# Patient Record
Sex: FEMALE | Race: WHITE | NOT HISPANIC OR LATINO | Employment: UNEMPLOYED | URBAN - METROPOLITAN AREA
[De-identification: names, ages, dates, MRNs, and addresses within clinical notes are randomized per-mention and may not be internally consistent; named-entity substitution may affect disease eponyms.]

---

## 2017-02-22 ENCOUNTER — APPOINTMENT (OUTPATIENT)
Dept: PULMONOLOGY | Facility: HOSPICE | Age: 57
End: 2017-02-22
Payer: MEDICAID

## 2017-02-28 ENCOUNTER — OFFICE VISIT (OUTPATIENT)
Dept: PULMONOLOGY | Facility: HOSPICE | Age: 57
End: 2017-02-28
Payer: MEDICAID

## 2017-02-28 VITALS
SYSTOLIC BLOOD PRESSURE: 118 MMHG | RESPIRATION RATE: 14 BRPM | HEART RATE: 96 BPM | HEIGHT: 65 IN | WEIGHT: 210 LBS | BODY MASS INDEX: 34.99 KG/M2 | DIASTOLIC BLOOD PRESSURE: 84 MMHG

## 2017-02-28 DIAGNOSIS — Z87.01 HISTORY OF PNEUMONIA: ICD-10-CM

## 2017-02-28 DIAGNOSIS — Z72.0 TOBACCO ABUSE: ICD-10-CM

## 2017-02-28 DIAGNOSIS — J44.9 CHRONIC OBSTRUCTIVE PULMONARY DISEASE, UNSPECIFIED COPD TYPE (HCC): ICD-10-CM

## 2017-02-28 DIAGNOSIS — G47.34 NOCTURNAL HYPOXEMIA: ICD-10-CM

## 2017-02-28 PROCEDURE — 99214 OFFICE O/P EST MOD 30 MIN: CPT | Performed by: NURSE PRACTITIONER

## 2017-02-28 RX ORDER — BUDESONIDE AND FORMOTEROL FUMARATE DIHYDRATE 160; 4.5 UG/1; UG/1
2 AEROSOL RESPIRATORY (INHALATION) 2 TIMES DAILY
Qty: 1 INHALER | Refills: 5 | Status: SHIPPED | OUTPATIENT
Start: 2017-02-28 | End: 2017-08-04 | Stop reason: SDUPTHER

## 2017-02-28 NOTE — PROGRESS NOTES
Chief Complaint   Patient presents with   • Follow-Up       HPI:  Pal Whiteside is a 56 y.o. year old female here today for follow-up on COPD. Last OV was with Zelda DENTON 8/28/15. History of pneumonia in 2014 requiring intubation and bronchoscopy that was negative. She was treated with abx/steroid therapy and recovered well. She smokes <1 pack daily. PFT 5/12/14 indicated FVC 2.38 L 73% predicted, FEV1 1.88 L 73% predicted, FEV1/FVC 79%, and DLCO 114% predicted. She is compliant with Symbicort 160/4.5 µg twice daily, Spiriva daily, and ProAir which is minimally used. Insurance switched her to Proventil HFA and she does not feel this is as effective. She uses FELICITAS 1-2x's weekly. She recently ran out of Symbicort. She uses 2.5L oxygen nightly and has not received updated tubing/supplies from [facility] and her filters have not been changed. She believes her dyspnea is stable but has a routine AM cough with green phlegm. She denies wheezing. She had a cold last week that is almost gone except mild nasal congestion. She denies any major changes in health since last OV except hearing loss to left hear - she is pending hearing test. She would like to quit smoking. Her  recently had an MI and he also smokes. They are trying to make healthy changes to improve health. She denies fever, chills, night sweats, chest pain, ankle swelling or hemoptysis. She denies GERD, sinus issues, headaches or dizziness on a regular basis.      ROS: As per HPI and otherwise negative if not stated.    Past Medical History   Diagnosis Date   • Arthritis      right knee   • EMPHYSEMA    • Breath shortness      occasional   • Dental disorder      upper and lower dentures   • Pain      right foot   • Psychiatric problem      Bipolar   • JONATHAN (obstructive sleep apnea)      Suspected       Past Surgical History   Procedure Laterality Date   • Umbilical hernia repair child  as child   • Foot surgery  1997     reconstruction, right foot surgery  "times three   • Biopsy ortho  10/4/2010     Performed by ANDREY PARK at SURGERY [facility]   • Loose body removal  10/4/2010     Performed by ANDREY PARK at SURGERY [facility] ORS       Family History   Problem Relation Age of Onset   • Diabetes     • Hypertension Mother    • Heart Disease Father    • Lung Disease Father    • Diabetes Brother      Type I DM       Social History     Social History   • Marital Status:      Spouse Name: N/A   • Number of Children: N/A   • Years of Education: N/A     Occupational History   • Not on file.     Social History Main Topics   • Smoking status: Former Smoker -- 0.50 packs/day for 32 years     Types: Cigarettes     Quit date: 07/15/2014   • Smokeless tobacco: Not on file   • Alcohol Use: 1.2 oz/week     2 Standard drinks or equivalent per week      Comment: occasional, 2-4 drinks, mixed drinks   • Drug Use: No      Comment: 10 year hx of meth use, denies IV drug use.   • Sexual Activity:     Partners: Male     Birth Control/ Protection: Post-Menopausal     Other Topics Concern   • Not on file     Social History Narrative       Allergies as of 02/28/2017 - Mal as Reviewed 02/28/2017   Allergen Reaction Noted   • Penicillins Rash 08/15/2014   • Lamotrigine  08/15/2014        @Vital signs for this encounter:  Filed Vitals:    02/28/17 1404   Height: 1.651 m (5' 5\")   Weight: 95.255 kg (210 lb)   Weight % change since last entry.: 0 %   BP: 118/84   Pulse: 96   BMI (Calculated): 34.95   Resp: 14   TempSrc: Temporal   O2 sat % room air: 99 %       Current medications as of today   Current Outpatient Prescriptions   Medication Sig Dispense Refill   • budesonide-formoterol (SYMBICORT) 160-4.5 MCG/ACT Aerosol Inhale 2 Puffs by mouth 2 Times a Day. 1 Inhaler 5   • Tiotropium Bromide Monohydrate (SPIRIVA RESPIMAT) 2.5 MCG/ACT Aero Soln Inhale 2 Inhalation by mouth every day. Assemble and prime. 1 Inhaler 5   • albuterol 108 (90 BASE) MCG/ACT Aero Soln " inhalation aerosol Inhale 2 Puffs by mouth every four hours as needed for Shortness of Breath. 1 Inhaler 1   • albuterol 108 (90 BASE) MCG/ACT Aero Soln inhalation aerosol Inhale 2 Puffs by mouth every 6 hours as needed for Shortness of Breath.     • albuterol 108 (90 BASE) MCG/ACT Aero Soln inhalation aerosol Inhale 2 Puffs by mouth every 6 hours as needed for Shortness of Breath.     • albuterol (PROAIR HFA) 108 (90 BASE) MCG/ACT Aero Soln inhalation aerosol Inhale 2 Puffs by mouth every four hours as needed for Shortness of Breath (wheezing). 1 Inhaler 5   • divalproex ER (DEPAKOTE ER) 500 MG TABLET SR 24 HR Take 500 mg by mouth every day.     • gabapentin (NEURONTIN) 300 MG Cap Take 1 Cap by mouth 3 times a day. 90 Cap 3   • pregabalin (LYRICA) 75 MG Cap Take 1 Cap by mouth 2 times a day. 60 Cap 3   • doxepin (SINEQUAN) 50 MG CAPS Take 100 mg by mouth every evening.     • ranitidine (ZANTAC) 300 MG tablet Take 300 mg by mouth 2 Times a Day.     • multivitamin (THERAGRAN) TABS Take 1 Tab by mouth every day. Indications: **OTC**     • aripiprazole (ABILIFY) 5 MG tablet Take 5 mg by mouth every day.     • nicotine (NICODERM) 21 MG/24HR PT24 Apply 1 Patch to skin as directed every day. 30 Patch 0     No current facility-administered medications for this visit.         Physical Exam:   Gen:           Alert and oriented, No apparent distress. Mood and affect appropriate, normal interaction with examiner.  Eyes:          PERRL, EOM intact, sclere white, conjunctive moist.  Ears:          Not examined.  Hearing:     Grossly intact.  Nose:          Normal, no lesions or deformities.  Dentition:    Poor dentition.  Oropharynx:   Tongue normal, posterior pharynx without erythema or exudate.  Mallampati Classification: 3  Neck:        Supple, trachea midline, no masses.  Respiratory Effort: No intercostal retractions or use of accessory muscles.   Lung Auscultation:      Clear to auscultation bilaterally but diminished t/o;  no rales, rhonchi or wheezing.  CV:            Regular rate and rhythm. No murmurs, rubs or gallops.  Abd:           Not examined.   Lymphadenopathy: Not examined.  Gait and Station: Normal.  Digits and Nails: No clubbing, cyanosis, petechiae, or nodes.   Cranial Nerves: II-XII grossly intact.  Skin:        No rashes, lesions or ulcers noted.               Ext:           No cyanosis or edema.      Assessment:  1. Chronic obstructive pulmonary disease, unspecified COPD type (CMS-HCC)  budesonide-formoterol (SYMBICORT) 160-4.5 MCG/ACT Aerosol    Tiotropium Bromide Monohydrate (SPIRIVA RESPIMAT) 2.5 MCG/ACT Aero Soln    AMB PULMONARY FUNCTION TEST/LAB   2. Tobacco abuse  REFERRAL TO TOBACCO CESSATION PROGRAM   3. Nocturnal hypoxemia  DME O2 NEW SET UP    DME CNOX BY DME CO   4. History of pneumonia         Immunizations:    Flu:2016  Pneumovax 23:2014  Prevnar 13:needs updating.    Plan:  1. PFT next OV.  2. Update Prenvar 13 next OV.  3. DME o2 order for 2.5L nightly. Patient will continue to benefit from this.  4. DME CNOX on RA.   5. Refill on symbicort 160/4.5mcg 2puffs BID, start Spiriva Respimat 2.5mcg 2 puffs BID and FELICITAS prn.  6. Referral to smoking cessation.  7. Consider referral to lung cancer screening program at next OV.  8. Follow up in 6 weeks with PFT, sooner if needed.

## 2017-02-28 NOTE — PATIENT INSTRUCTIONS
1.  Next office visit update Prevnar 13 shot.  2. Breathing test next office visit.  3. Restart inhalers.  4. Follow up in 6 weeks with testing, sooner if needed.

## 2017-02-28 NOTE — MR AVS SNAPSHOT
"        Pal Whiteside   2017 2:20 PM   Office Visit   MRN: [id number]    Department:  [facility]   Dept Phone:  [phone]    Description:  Female : 1960   Provider:  DIPTI Goldstein           Reason for Visit     Follow-Up           Allergies as of 2017     Allergen Noted Reactions    Penicillins 08/15/2014   Rash    Lamotrigine 08/15/2014       \"Made me go cross-eyed.\"      You were diagnosed with     Chronic obstructive pulmonary disease, unspecified COPD type (CMS-HCC)   [8004215]       Tobacco abuse   [844990]       Nocturnal hypoxemia   [774356]       History of pneumonia   [960968]         Vital Signs     Blood Pressure Pulse Respirations Height Weight Body Mass Index    118/84 mmHg 96 14 1.651 m (5' 5\") 95.255 kg (210 lb) 34.95 kg/m2    Smoking Status                   Former Smoker           Basic Information     Date Of Birth Sex Race Ethnicity Preferred Language    1960 Female White Non- English      Your appointments     2017 12:00 PM   Pulmonary Function Test with PFT-RM3   [facility] (--)    [address]  [address]  [place] [id number] [phone]            2017  1:00 PM   Established Patient Pul with RAFIA GoldsteinRKELLIE   [facility] (--)    [address]  [address]  [place] [id number] [phone]              Problem List              ICD-10-CM Priority Class Noted - Resolved    Tobacco abuse Z72.0 Low  2014 - Present    Bipolar disorder (CMS-HCC) F31.9 Low  2014 - Present    COPD (chronic obstructive pulmonary disease) (CMS-HCC) J44.9   2016 - Present    Osteoarthritis of multiple joints M15.9   2016 - Present    Fibromyalgia M79.7   2016 - Present    S/P foot surgery Z98.890   2016 - Present    History of methamphetamine abuse Z87.898   2016 - Present    Nocturnal hypoxemia G47.34   2017 - Present    History of pneumonia " Z87.01   2/28/2017 - Present      Health Maintenance        Date Due Completion Dates    COLONOSCOPY 11/20/2011 ---    MAMMOGRAM 12/13/2012 12/13/2011, 5/2/2006, 10/25/2004    IMM INFLUENZA (1) 9/1/2016 ---    PAP SMEAR 1/27/2019 1/27/2016, 1/27/2016    IMM DTaP/Tdap/Td Vaccine (2 - Td) 1/20/2026 1/20/2016            Current Immunizations     Pneumococcal polysaccharide vaccine (PPSV-23) 8/6/2014 10:13 AM    Tdap Vaccine 1/20/2016      Below and/or attached are the medications your provider expects you to take. Review all of your home medications and newly ordered medications with your provider and/or pharmacist. Follow medication instructions as directed by your provider and/or pharmacist. Please keep your medication list with you and share with your provider. Update the information when medications are discontinued, doses are changed, or new medications (including over-the-counter products) are added; and carry medication information at all times in the event of emergency situations     Allergies:  PENICILLINS - Rash     LAMOTRIGINE - (reactions not documented)               Medications  Valid as of: February 28, 2017 -  2:39 PM    Generic Name Brand Name Tablet Size Instructions for use    Albuterol Sulfate (Aero Soln) albuterol 108 (90 BASE) MCG/ACT Inhale 2 Puffs by mouth every 6 hours as needed for Shortness of Breath.        Albuterol Sulfate (Aero Soln) albuterol 108 (90 BASE) MCG/ACT Inhale 2 Puffs by mouth every 6 hours as needed for Shortness of Breath.        Albuterol Sulfate (Aero Soln) albuterol 108 (90 BASE) MCG/ACT Inhale 2 Puffs by mouth every four hours as needed for Shortness of Breath.        Albuterol Sulfate (Aero Soln) albuterol 108 (90 BASE) MCG/ACT Inhale 2 Puffs by mouth every four hours as needed for Shortness of Breath (wheezing).        ARIPiprazole (Tab) ABILIFY 5 MG Take 5 mg by mouth every day.        Budesonide-Formoterol Fumarate (Aerosol) SYMBICORT 160-4.5 MCG/ACT Inhale 2 Puffs  by mouth 2 Times a Day.        Divalproex Sodium (TABLET SR 24 HR) DEPAKOTE  MG Take 500 mg by mouth every day.        Doxepin HCl (Cap) SINEQUAN 50 MG Take 100 mg by mouth every evening.        Gabapentin (Cap) NEURONTIN 300 MG Take 1 Cap by mouth 3 times a day.        Multiple Vitamin (Tab) THERAGRAN  Take 1 Tab by mouth every day. Indications: **OTC**        Nicotine (PATCH 24 HR) NICODERM 21 MG/24HR Apply 1 Patch to skin as directed every day.        Pregabalin (Cap) LYRICA 75 MG Take 1 Cap by mouth 2 times a day.        RaNITidine HCl (Tab) ZANTAC 300 MG Take 300 mg by mouth 2 Times a Day.        Tiotropium Bromide Monohydrate (Aero Soln) Tiotropium Bromide Monohydrate 2.5 MCG/ACT Inhale 2 Inhalation by mouth every day. Assemble and prime.        .                 Medicines prescribed today were sent to:     [id number] - [place], [place] - [address] [address]    Phone: [phone] Fax: [phone]    Open 24 Hours?: No      Medication refill instructions:       If your prescription bottle indicates you have medication refills left, it is not necessary to call your provider’s office. Please contact your pharmacy and they will refill your medication.    If your prescription bottle indicates you do not have any refills left, you may request refills at any time through one of the following ways: The online [employer]® system (except Urgent Care), by calling your provider’s office, or by asking your pharmacy to contact your provider’s office with a refill request. Medication refills are processed only during regular business hours and may not be available until the next business day. Your provider may request additional information or to have a follow-up visit with you prior to refilling your medication.   *Please Note: Medication refills are assigned a new Rx number when refilled electronically. Your pharmacy may indicate that no refills were authorized even though a  new prescription for the same medication is available at the pharmacy. Please request the medicine by name with the pharmacy before contacting your provider for a refill.        Your To Do List     Future Labs/Procedures Complete By Expires    AMB PULMONARY FUNCTION TEST/LAB  As directed 2/28/2018    Comments:    At next OV      Referral     A referral request has been sent to our patient care coordination department. Please allow 3-5 business days for us to process this request and contact you either by phone or mail. If you do not hear from us by the 5th business day, please call us at [phone].        Instructions    1.  Next office visit update Prevnar 13 shot.  2. Breathing test next office visit.  3. Restart inhalers.  4. Follow up in 6 weeks with testing, sooner if needed.       Other Notes About Your Plan     UDS on 12/4/15: positive for methamphetamines. PLEASE DO NOT PRESCRIBE PATIENT WITH NARCOTICS.            MyChart Status: Patient Declined

## 2017-03-01 ENCOUNTER — TELEPHONE (OUTPATIENT)
Dept: PULMONOLOGY | Facility: HOSPICE | Age: 57
End: 2017-03-01

## 2017-03-01 NOTE — TELEPHONE ENCOUNTER
MEDICATION PRIOR AUTHORIZATION NEEDED:    1. Name of Medication: Proair 108 (90 base)    2. Requested By (Name of Pharmacy): [facility]     3. Is insurance on file current? yes    4. What is the name & phone number of the 3rd party payor? Medicaid [phone]

## 2017-03-02 NOTE — TELEPHONE ENCOUNTER
DOCUMENTATION OF PRIOR AUTH STATUS    1. Medication name and dose: Proair 108 (90base) mcg    2. Name and Phone # of Prescription coverage company: Medicaid [phone]    3. Date Prior Auth was submitted: 3/1/2017    4. What information was given to obtain insurance decision: Clinical notes    5. Prior Auth letter Approved or Denied: Approved through 3/1/2018    6. Pharmacy notified: Yes    7. Patient notified: Yes

## 2017-03-15 DIAGNOSIS — R06.02 SHORTNESS OF BREATH: ICD-10-CM

## 2017-03-16 RX ORDER — ALBUTEROL SULFATE 90 MCG
HFA AEROSOL WITH ADAPTER (GRAM) INHALATION
Qty: 1 INHALER | Refills: 6 | Status: SHIPPED | OUTPATIENT
Start: 2017-03-16 | End: 2017-08-04

## 2017-03-16 NOTE — TELEPHONE ENCOUNTER
Have we ever prescribed this med? Yes.  If yes, what date? 02/28/2017    Last OV: 02/28/2017    Next OV: 04/05/2017    DX: SOB     Medications:   Requested Prescriptions     Pending Prescriptions Disp Refills   • PROVENTIL  (90 BASE) MCG/ACT Aero Soln inhalation aerosol [Pharmacy Med Name: PROVENTIL 108MCG/A  AER [facility]] 1 Inhaler 6     Sig: INHALE TWO PUFFS BY MOUTH EVERY FOUR HOURS AS NEEDED FOR SHORTNESS OF BREATH

## 2017-04-05 ENCOUNTER — APPOINTMENT (OUTPATIENT)
Dept: PULMONOLOGY | Facility: HOSPICE | Age: 57
End: 2017-04-05
Payer: MEDICAID

## 2017-04-26 ENCOUNTER — NON-PROVIDER VISIT (OUTPATIENT)
Dept: PULMONOLOGY | Facility: HOSPICE | Age: 57
End: 2017-04-26
Payer: MEDICAID

## 2017-04-26 ENCOUNTER — OFFICE VISIT (OUTPATIENT)
Dept: PULMONOLOGY | Facility: HOSPICE | Age: 57
End: 2017-04-26
Payer: MEDICAID

## 2017-04-26 VITALS
DIASTOLIC BLOOD PRESSURE: 88 MMHG | WEIGHT: 227 LBS | SYSTOLIC BLOOD PRESSURE: 142 MMHG | HEIGHT: 65 IN | RESPIRATION RATE: 16 BRPM | OXYGEN SATURATION: 95 % | BODY MASS INDEX: 37.82 KG/M2 | HEART RATE: 109 BPM

## 2017-04-26 DIAGNOSIS — J44.9 CHRONIC OBSTRUCTIVE PULMONARY DISEASE, UNSPECIFIED COPD TYPE (HCC): ICD-10-CM

## 2017-04-26 DIAGNOSIS — Z87.01 HISTORY OF PNEUMONIA: ICD-10-CM

## 2017-04-26 DIAGNOSIS — Z72.0 TOBACCO ABUSE: ICD-10-CM

## 2017-04-26 DIAGNOSIS — F15.11 HISTORY OF METHAMPHETAMINE ABUSE (HCC): ICD-10-CM

## 2017-04-26 DIAGNOSIS — G47.34 NOCTURNAL HYPOXEMIA: ICD-10-CM

## 2017-04-26 PROCEDURE — 99214 OFFICE O/P EST MOD 30 MIN: CPT | Mod: 25 | Performed by: NURSE PRACTITIONER

## 2017-04-26 PROCEDURE — 94729 DIFFUSING CAPACITY: CPT | Performed by: INTERNAL MEDICINE

## 2017-04-26 PROCEDURE — 90670 PCV13 VACCINE IM: CPT | Performed by: NURSE PRACTITIONER

## 2017-04-26 PROCEDURE — 94060 EVALUATION OF WHEEZING: CPT | Performed by: INTERNAL MEDICINE

## 2017-04-26 PROCEDURE — 94726 PLETHYSMOGRAPHY LUNG VOLUMES: CPT | Performed by: INTERNAL MEDICINE

## 2017-04-26 PROCEDURE — 90471 IMMUNIZATION ADMIN: CPT | Performed by: NURSE PRACTITIONER

## 2017-04-26 RX ORDER — ALBUTEROL SULFATE 2.5 MG/3ML
2.5 SOLUTION RESPIRATORY (INHALATION) EVERY 4 HOURS PRN
Qty: 75 ML | Refills: 5 | Status: SHIPPED | OUTPATIENT
Start: 2017-04-26 | End: 2017-08-04

## 2017-04-26 RX ORDER — ALBUTEROL SULFATE 90 UG/1
2 AEROSOL, METERED RESPIRATORY (INHALATION) EVERY 4 HOURS PRN
Qty: 1 INHALER | Refills: 5 | Status: SHIPPED | OUTPATIENT
Start: 2017-04-26 | End: 2017-08-28

## 2017-04-26 ASSESSMENT — PULMONARY FUNCTION TESTS
FEV1_PERCENT_CHANGE: -6
FEV1/FVC: 77.59
FVC: 2.48
FVC_PERCENT_PREDICTED: 73
FEV1: 1.8
FEV1_PERCENT_PREDICTED: 69
FVC: 2.32
FEV1/FVC_PERCENT_PREDICTED: 88
FEV1/FVC: 70
FEV1: 1.74
FEV1_PERCENT_CHANGE: 3
FEV1_PREDICTED: 2.49
FVC_PERCENT_PREDICTED: 78
FVC_PREDICTED: 3.18
FEV1/FVC_PERCENT_PREDICTED: 99
FEV1/FVC_PERCENT_PREDICTED: 78
FEV1/FVC_PERCENT_CHANGE: -50
FEV1_PERCENT_PREDICTED: 72

## 2017-04-26 NOTE — MR AVS SNAPSHOT
"        Pal Whiteside   2017 3:00 PM   Office Visit   MRN: [id number]    Department:  [facility]   Dept Phone:  [phone]    Description:  Female : 1960   Provider:  DIPTI Goldstein           Reason for Visit     Follow-Up 6 weeks w/pft      Allergies as of 2017     Allergen Noted Reactions    Penicillins 08/15/2014   Rash    Lamotrigine 08/15/2014       \"Made me go cross-eyed.\"      You were diagnosed with     Chronic obstructive pulmonary disease, unspecified COPD type (CMS-[facility])   [7392325]       Tobacco abuse   [740410]       Nocturnal hypoxemia   [924066]       History of pneumonia   [394845]       History of methamphetamine abuse   [863228]         Vital Signs     Blood Pressure Pulse Respirations Height Weight Body Mass Index    142/88 mmHg 109 16 1.651 m (5' 5\") 102.967 kg (227 lb) 37.77 kg/m2    Oxygen Saturation Smoking Status                95% Current Every Day Smoker          Basic Information     Date Of Birth Sex Race Ethnicity Preferred Language    1960 Female White Non- English      Your appointments     May 03, 2017 10:30 AM   Smoking Cessation Consult with [facility] EXAM 1   [facility]  (--)    [address] [address]   [phone]            May 03, 2017 11:00 AM   Smoking Cessation Class with PULMONARY REHAB PM CLASS   PULMON[facility] LAB OP [facility] [place] [facility] (--)    [address] [address]   [phone]           From [address], take the [facility] exit (#66) and head west on [address].  Turn right on [address]. Park in the [facility] Parking garage, or you may use our  parking.  The [facility] entrance is left of the  parking by the red and pink heart sculpture.            May 10, 2017 11:00 AM   Smoking Cessation Class with PULMONARY REHAB PM CLASS   PULMONARY LAB OP [facility] [place] [facility] (--)    [address] [facility] [address] [address]   [phone]          " From [facility], take the [address] exit (#[address]) and head west on [address].  Turn right on [address]. Park in the [address] Parking garage, or you may use our  parking.  The [facility] entrance is left of the  parking by the red and pink heart sculpture.            May 17, 2017 11:00 AM   Smoking Cessation Class with PULMONARY REHAB PM CLASS   PULMONARY LAB OP [facility] [place] [facility] (--)    [address] [facility] [id number] [phone]           From [facility], take the [address] exit (#[address]) and head west on [address].  Turn right on [address]. Park in the [address] Parking garage, or you may use our  parking.  The [facility] entrance is left of the  parking by the red and pink heart sculpture.            May 24, 2017 11:00 AM   Smoking Cessation Class with PULMONARY REHAB PM CLASS   PULMONARY LAB OP [facility] [place] [facility] (--)    [address] [facility] [id number] [phone]           From [facility], take the [address] exit (#[address]) and head west on [address].  Turn right on [address]. Park in the [address] Parking garage, or you may use our  parking.  The [facility] entrance is left of the  parking by the red and pink heart sculpture.            Jul 26, 2017  3:00 PM   Established Patient Pul with DIPTI Goldstein   [facility] (--)    [address]  [address]  [place] [id number] [phone]              Problem List              ICD-10-CM Priority Class Noted - Resolved    Tobacco abuse Z72.0 Low  7/22/2014 - Present    Bipolar disorder (CMS-HCC) F31.9 Low  7/22/2014 - Present    COPD (chronic obstructive pulmonary disease) (CMS-HCC) J44.9   1/20/2016 - Present    Osteoarthritis of multiple joints M15.9   1/20/2016 - Present    Fibromyalgia M79.7   1/20/2016 - Present    S/P foot surgery Z98.890   1/20/2016 - Present    History of methamphetamine abuse Z87.898   1/20/2016 -  Present    Nocturnal hypoxemia G47.34   2/28/2017 - Present    History of pneumonia Z87.01   2/28/2017 - Present      Health Maintenance        Date Due Completion Dates    COLONOSCOPY 11/20/2011 ---    MAMMOGRAM 12/13/2012 12/13/2011, 5/2/2006, 10/25/2004    PAP SMEAR 1/27/2019 1/27/2016, 1/27/2016    IMM DTaP/Tdap/Td Vaccine (2 - Td) 1/20/2026 1/20/2016            Current Immunizations     13-VALENT PCV PREVNAR  Incomplete    Pneumococcal polysaccharide vaccine (PPSV-23) 8/6/2014 10:13 AM    Tdap Vaccine 1/20/2016      Below and/or attached are the medications your provider expects you to take. Review all of your home medications and newly ordered medications with your provider and/or pharmacist. Follow medication instructions as directed by your provider and/or pharmacist. Please keep your medication list with you and share with your provider. Update the information when medications are discontinued, doses are changed, or new medications (including over-the-counter products) are added; and carry medication information at all times in the event of emergency situations     Allergies:  PENICILLINS - Rash     LAMOTRIGINE - (reactions not documented)               Medications  Valid as of: April 26, 2017 -  3:46 PM    Generic Name Brand Name Tablet Size Instructions for use    Albuterol Sulfate (Aero Soln) albuterol 108 (90 BASE) MCG/ACT Inhale 2 Puffs by mouth every 6 hours as needed for Shortness of Breath.        Albuterol Sulfate (Aero Soln) albuterol 108 (90 BASE) MCG/ACT Inhale 2 Puffs by mouth every 6 hours as needed for Shortness of Breath.        Albuterol Sulfate (Aero Soln) albuterol 108 (90 BASE) MCG/ACT Inhale 2 Puffs by mouth every four hours as needed for Shortness of Breath.        Albuterol Sulfate (Aero Soln) albuterol 108 (90 BASE) MCG/ACT Inhale 2 Puffs by mouth every four hours as needed for Shortness of Breath (wheezing).        Albuterol Sulfate (Aero Soln) PROVENTIL  (90 BASE) MCG/ACT  INHALE TWO PUFFS BY MOUTH EVERY FOUR HOURS AS NEEDED FOR SHORTNESS OF BREATH        Albuterol Sulfate (Aero Soln) albuterol 108 (90 BASE) MCG/ACT Inhale 2 Puffs by mouth every four hours as needed for Shortness of Breath (wheezing).        Albuterol Sulfate ([place] Soln) PROVENTIL 2.5mg/3ml 3 mL by Nebulization route every four hours as needed for Shortness of Breath.        ARIPiprazole (Tab) ABILIFY 5 MG Take 5 mg by mouth every day.        Budesonide-Formoterol Fumarate (Aerosol) SYMBICORT 160-4.5 MCG/ACT Inhale 2 Puffs by mouth 2 Times a Day.        Divalproex Sodium (TABLET SR 24 HR) DEPAKOTE  MG Take 500 mg by mouth every day.        Doxepin HCl (Cap) SINEQUAN 50 MG Take 100 mg by mouth every evening.        Gabapentin (Cap) NEURONTIN 300 MG Take 1 Cap by mouth 3 times a day.        Multiple Vitamin (Tab) THERAGRAN  Take 1 Tab by mouth every day. Indications: **OTC**        Nicotine (PATCH 24 HR) NICODERM 21 MG/24HR Apply 1 Patch to skin as directed every day.        Pregabalin (Cap) LYRICA 75 MG Take 1 Cap by mouth 2 times a day.        RaNITidine HCl (Tab) ZANTAC 300 MG Take 300 mg by mouth 2 Times a Day.        Tiotropium Bromide Monohydrate (Aero Soln) Tiotropium Bromide Monohydrate 2.5 MCG/ACT Inhale 2 Inhalation by mouth every day. Assemble and prime.        .                 Medicines prescribed today were sent to:     [id number] - [place], [place] - [address] [address]    Phone: [phone] Fax: [phone]    Open 24 Hours?: No      Medication refill instructions:       If your prescription bottle indicates you have medication refills left, it is not necessary to call your provider’s office. Please contact your pharmacy and they will refill your medication.    If your prescription bottle indicates you do not have any refills left, you may request refills at any time through one of the following ways: The online [employer] system (except Urgent Care), by  calling your provider’s office, or by asking your pharmacy to contact your provider’s office with a refill request. Medication refills are processed only during regular business hours and may not be available until the next business day. Your provider may request additional information or to have a follow-up visit with you prior to refilling your medication.   *Please Note: Medication refills are assigned a new Rx number when refilled electronically. Your pharmacy may indicate that no refills were authorized even though a new prescription for the same medication is available at the pharmacy. Please request the medicine by name with the pharmacy before contacting your provider for a refill.        Instructions    1. Complete overnight oxygen test using 2.5L oxygen. May call results.  2. Referral to lung cancer screening program.  3. Prevnar 13 given today.  4. Follow up in 3 months with CNOX/CT scan, sooner if needed.  5. Continue inhaler regimen. Rx sent Proair again.       Other Notes About Your Plan     UDS on 12/4/15: positive for methamphetamines. PLEASE DO NOT PRESCRIBE PATIENT WITH NARCOTICS.            textmetix Access Code: [email or web address]  Expires: 5/26/2017  2:47 PM    textmetix  A secure, online tool to manage your health information     [employer]’s textmetix® is a secure, online tool that connects you to your personalized health information from the privacy of your home -- day or night - making it very easy for you to manage your healthcare. Once the activation process is completed, you can even access your medical information using the textmetix jeni, which is available for free in the Apple Jeni store or Google Play store.     textmetix provides the following levels of access (as shown below):   My Chart Features   [facility] Doctor [facility]  Specialists [facility]  Urgent  Care Non-[facility]  Primary Care  Doctor   Email your healthcare team securely and privately 24/7 X X X    Manage appointments: schedule  your next appointment; view details of past/upcoming appointments X      Request prescription refills. X      View recent personal medical records, including lab and immunizations X X X X   View health record, including health history, allergies, medications X X X X   Read reports about your outpatient visits, procedures, consult and ER notes X X X X   See your discharge summary, which is a recap of your hospital and/or ER visit that includes your diagnosis, lab results, and care plan. X X       How to register for [employer]:  1. Go to  [email or web address].  2. Click on the Sign Up Now box, which takes you to the New Member Sign Up page. You will need to provide the following information:  a. Enter your [employer] Seedpaper Access Code exactly as it appears at the top of this page. (You will not need to use this code after you’ve completed the sign-up process. If you do not sign up before the expiration date, you must request a new code.)   b. Enter your date of birth.   c. Enter your home email address.   d. Click Submit, and follow the next screen’s instructions.  3. Create a Seedpost & Seedpaper ID. This will be your [employer] Seedpaper login ID and cannot be changed, so think of one that is secure and easy to remember.  4. Create a [employer] password. You can change your password at any time.  5. Enter your Password Reset Question and Answer. This can be used at a later time if you forget your password.   6. Enter your e-mail address. This allows you to receive e-mail notifications when new information is available in [employer] Seedpaper.  7. Click Sign Up. You can now view your health information.    For assistance activating your [employer] Seed[employer] account, call [phone]        Quit Tobacco Information     Do you want to quit using tobacco?    Quitting tobacco decreases risks of cancer, heart and lung disease, increases life expectancy, improves sense of taste and smell, and increases spending money, among other benefits.    If you are thinking about quitting,  we can help.  • [facility] Quit Tobacco Program: 631.997.9978  o Program occurs weekly for four weeks and includes pharmacist consultation on products to support quitting smoking or chewing tobacco. A provider referral is needed for pharmacist consultation.  • Tobacco Users Help Hotline: 9-329-QUIT-NOW (400-1197) or https://[place].quitlogix.org/  o Free, confidential telephone and online coaching for [place] residents. Sessions are designed on a schedule that is convenient for you. Eligible clients receive free nicotine replacement therapy.  • Nationally: www.smokefree.gov  o Information and professional assistance to support both immediate and long-term needs as you become, and remain, a non-smoker. Smokefree.gov allows you to choose the help that best fits your needs.

## 2017-04-26 NOTE — PROGRESS NOTES
Chief Complaint   Patient presents with   • Follow-Up     6 weeks w/pft       HPI:  Pal Whiteside is a 56 y.o. year old female here today for follow-up on PFT and CNOX results. Last OV was 2/28/17. History of COPD/asthma. . History of pneumonia in 2014 requiring intubation and bronchoscopy that was negative. She was treated with abx/steroid therapy and recovered well. She smokes <1 pack daily. PFT 5/12/14 indicated FVC 2.38 L 73% predicted, FEV1 1.88 L 73% predicted, FEV1/FVC 79%, and DLCO 114% predicted. Repeat PFT 4/26/17 indicates FEV1 1.74L or 69% predicted, FEV1/FVC ratio 70, TLC 98% predicted, and DLCo 110% predicted. This appears to be more asthmatic in nature than COPD. She is compliant with Symbicort 160/4.5 µg twice daily, Spiriva daily, and ProAir which is minimally used. Insurance switched her to Proventil HFA and she does not feel this is as effective. Prior auth approved Proair through 3/2018. She uses FELICITAS 1-2x's weekly. She uses 2.5L oxygen nightly. CNOX indicated persistent low oxygen level with cyclical desaturations on RA. DME did not perform test accurately - but there were 2 episodes of possible cyclical desaturations with cardiac pulse changes. I reviewed findings with patient. BMI 41.    She believes her dyspnea is stable but has a routine AM cough with green phlegm. She denies wheezing. She notes sore throat for 1 week.  She denies fever, chills, night sweats, chest pain, ankle swelling or hemoptysis. She denies GERD, sinus issues, headaches or dizziness on a regular basis. She denies any major changes in health since last OV except hearing loss to left hear - she is pending hearing test. She would like to quit smoking. Her  recently had an MI and he also smokes. They are trying to make healthy changes to improve health.    ROS: As per HPI and otherwise negative if not stated.    Past Medical History   Diagnosis Date   • Arthritis      right knee   • EMPHYSEMA    • Breath shortness   "    occasional   • Dental disorder      upper and lower dentures   • Pain      right foot   • Psychiatric problem      Bipolar   • JONATHAN (obstructive sleep apnea)      Suspected       Past Surgical History   Procedure Laterality Date   • Umbilical hernia repair child  as child   • Foot surgery  1997     reconstruction, right foot surgery times three   • Biopsy ortho  10/4/2010     Performed by ANDREY PARK at [facility]   • Loose body removal  10/4/2010     Performed by ANDREY PARK at [facility]       Family History   Problem Relation Age of Onset   • Diabetes     • Hypertension Mother    • Heart Disease Father    • Lung Disease Father    • Diabetes Brother      Type I DM       Social History     Social History   • Marital Status:      Spouse Name: N/A   • Number of Children: N/A   • Years of Education: N/A     Occupational History   • Not on file.     Social History Main Topics   • Smoking status: Current Every Day Smoker -- 0.50 packs/day for 32 years     Types: Cigarettes     Last Attempt to Quit: 07/15/2014   • Smokeless tobacco: Never Used   • Alcohol Use: 1.2 oz/week     2 Standard drinks or equivalent per week      Comment: occasional, 2-4 drinks, mixed drinks   • Drug Use: No      Comment: 10 year hx of meth use, denies IV drug use.   • Sexual Activity:     Partners: Male     Birth Control/ Protection: Post-Menopausal     Other Topics Concern   • Not on file     Social History Narrative       Allergies as of 04/26/2017 - Mal as Reviewed 04/26/2017   Allergen Reaction Noted   • Penicillins Rash 08/15/2014   • Lamotrigine  08/15/2014        @Vital signs for this encounter:  Filed Vitals:    04/26/17 1501   Height: 1.651 m (5' 5\")   Weight: 102.967 kg (227 lb)   Weight % change since last entry.: 0 %   BP: 142/88   Pulse: 109   BMI (Calculated): 37.77   Resp: 16   O2 sat % room air: 92 %       Current medications as of today   Current Outpatient Prescriptions "   Medication Sig Dispense Refill   • PROVENTIL  (90 BASE) MCG/ACT Aero Soln inhalation aerosol INHALE TWO PUFFS BY MOUTH EVERY FOUR HOURS AS NEEDED FOR SHORTNESS OF BREATH 1 Inhaler 6   • budesonide-formoterol (SYMBICORT) 160-4.5 MCG/ACT Aerosol Inhale 2 Puffs by mouth 2 Times a Day. 1 Inhaler 5   • Tiotropium Bromide Monohydrate (SPIRIVA RESPIMAT) 2.5 MCG/ACT Aero Soln Inhale 2 Inhalation by mouth every day. Assemble and prime. 1 Inhaler 5   • albuterol 108 (90 BASE) MCG/ACT Aero Soln inhalation aerosol Inhale 2 Puffs by mouth every four hours as needed for Shortness of Breath. 1 Inhaler 1   • albuterol (PROAIR HFA) 108 (90 BASE) MCG/ACT Aero Soln inhalation aerosol Inhale 2 Puffs by mouth every four hours as needed for Shortness of Breath (wheezing). 1 Inhaler 5   • divalproex ER (DEPAKOTE ER) 500 MG TABLET SR 24 HR Take 500 mg by mouth every day.     • gabapentin (NEURONTIN) 300 MG Cap Take 1 Cap by mouth 3 times a day. 90 Cap 3   • doxepin (SINEQUAN) 50 MG CAPS Take 100 mg by mouth every evening.     • ranitidine (ZANTAC) 300 MG tablet Take 300 mg by mouth 2 Times a Day.     • aripiprazole (ABILIFY) 5 MG tablet Take 5 mg by mouth every day.     • albuterol 108 (90 BASE) MCG/ACT Aero Soln inhalation aerosol Inhale 2 Puffs by mouth every 6 hours as needed for Shortness of Breath.     • albuterol 108 (90 BASE) MCG/ACT Aero Soln inhalation aerosol Inhale 2 Puffs by mouth every 6 hours as needed for Shortness of Breath.     • pregabalin (LYRICA) 75 MG Cap Take 1 Cap by mouth 2 times a day. (Patient not taking: Reported on 4/26/2017) 60 Cap 3   • multivitamin (THERAGRAN) TABS Take 1 Tab by mouth every day. Indications: **OTC**     • nicotine (NICODERM) 21 MG/24HR PT24 Apply 1 Patch to skin as directed every day. 30 Patch 0     No current facility-administered medications for this visit.         Physical Exam:   Gen:           Alert and oriented, No apparent distress. Mood and affect appropriate, normal  interaction with examiner.  Eyes:          PERRL, EOM intact, sclere white, conjunctive moist.  Ears:          Not examined.   Hearing:     Grossly intact.  Nose:          Normal, no lesions or deformities.  Dentition:    Good dentition.  Oropharynx:   Tongue normal, posterior pharynx without erythema or exudate.  Mallampati Classification: 3  Neck:        Supple, trachea midline, no masses.  Respiratory Effort: No intercostal retractions or use of accessory muscles.   Lung Auscultation:      Clear to auscultation bilaterally but diminished t/o; no rales, rhonchi or wheezing.  CV:            Regular rate and rhythm. No murmurs, rubs or gallops.  Abd:           Not examined. Overweight.  Lymphadenopathy: Not examined.  Gait and Station: Normal.  Digits and Nails: No clubbing, cyanosis, petechiae, or nodes.   Cranial Nerves: II-XII grossly intact.  Skin:        No rashes, lesions or ulcers noted.               Ext:           No cyanosis or edema.      Assessment:  1. Chronic obstructive pulmonary disease, unspecified COPD type (CMS-HCC)     2. Tobacco abuse     3. Nocturnal hypoxemia     4. History of pneumonia     5. History of methamphetamine abuse         Immunizations:    Flu:2016  Pneumovax 23:2014  Prevnar 13:given today    Plan:  1. Prevnar 13 given today.  2. Referral to lung cancer screening program for continued smoking. If she does not qualify, arrange for low dose CT scan prior to next OV.  3. Smoking cessation encouraged. Starting smoking cessation program.  4. DME CNOX on 2.5L oxygen now. May call results. Patient declines sleep study at this time. Continue 2.5L oxygen at night.  5. Discussed respiratory hygiene.  6. Encouraged routine walking/weight loss.  7. Follow up in 3 months with CNOX/CT scan, sooner if needed.

## 2017-04-26 NOTE — PROCEDURES
Fair patient effort & cooperation.   The results of this test meet the ATS standards for acceptability and repeatability.  The Spirometry results meet the ATS/ERS standards acceptability & repeatability. At the end of the FVC patient would pause on the exhale and then take a small breath and then pause again.  The DLCO was uncorrected for Hgb.  A bronchodilator of Ventolin HFA- 2puffs via spacer was administered.  ATS/ERS standards for the DLCO were not met due to the IVC was less than 85% of predicted but were repeatable.    SPIROMETRY:  1. FVC was 2.48 L, 78 % of predicted  2. FEV1 was 1.74 L, 69 % of predicted   3. FEV1/FVC ratio was 77 %  4. There was borderline response to bronchodilators   5. Flow volume loop was small but normal in shape    LUNG VOLUMES:  1. TLC was 98 % of predicted   2. RV was  124 % of predicted     DIFFUSION CAPACITY:  1.Defusion capacity was 110 % of predicted       IMPRESSION:  The patient has mild reduction in FVC and FEV1 with a preserved ratio especially after bronchodilators. The patient also has normal total lung capacity with air trapping. Her residual volume was 124% of predicted. These test findings do not fit either restrictive or obstructive ventilatory pattern. Her moderate reduction in FVC and FEV1 with a first trapping could be a result of body habitus. Clinical correlation is required.

## 2017-04-26 NOTE — PATIENT INSTRUCTIONS
1. Complete overnight oxygen test using 2.5L oxygen. May call results.  2. Referral to lung cancer screening program.  3. Prevnar 13 given today.  4. Follow up in 3 months with CNOX/CT scan, sooner if needed.  5. Continue inhaler regimen. Rx sent Proair again.

## 2017-04-26 NOTE — MR AVS SNAPSHOT
"        Pal Whiteside   2017 2:00 PM   Non-Provider Visit   MRN: [id number]    Department:  [facility]   Dept Phone:  [phone]    Description:  Female : 1960   Provider:  [id number]           Reason for Visit     COPD           Allergies as of 2017     Allergen Noted Reactions    Penicillins 08/15/2014   Rash    Lamotrigine 08/15/2014       \"Made me go cross-eyed.\"      You were diagnosed with     Chronic obstructive pulmonary disease, unspecified COPD type (CMS-[facility])   [[id number]]         Vital Signs     Smoking Status                   Current Every Day Smoker           Basic Information     Date Of Birth Sex Race Ethnicity Preferred Language    1960 Female White Non- English      Your appointments     May 03, 2017 10:30 AM   Smoking Cessation Consult with [facility] EXAM 1   [facility] [place] [facility]  (--)    [address] [facility] [address] [facility] [id number]   [phone]            May 03, 2017 11:00 AM   Smoking Cessation Class with PULMONARY REHAB PM CLASS   [place]UL[place]ARY LAB [place] [facility] (--)    [address] [facility] [id number] [phone]           From [address] take the [address] exit [address]) and head west on [address].  Turn right on [address]. Park in the [address] Parking garage, or you may use our  parking.  The [place] [facility] entrance is left of the  parking by the red and pink heart sculpture.            May 10, 2017 11:00 AM   Smoking Cessation Class with PULMONARY REHAB PM CLASS   [place]UL[place]ARY LAB [place] [facility] (--)    [address] [facility] [id number] [phone]           From [address] take the [address] exit [address]) and head west on [address].  Turn right on [address]. Park in the [address] Parking garage, or you may use our  parking.  The [place] [facility] entrance is left of the  parking by the red and pink heart sculpture.            May 17, 2017 11:00 AM   Smoking " Cessation Class with PULMONARY REHAB PM CLASS   PULMONARY LAB OP [facility] (--)    [address] [facility] [place] [id number] [phone]           [address], take the [facility] exit [address]) and head west on [address].  Turn right on [address]. Park in the [facility] Parking garage, or you may use our  parking.  The [place] [facility] entrance is left of the  parking by the red and pink heart sculpture.            May 24, 2017 11:00 AM   Smoking Cessation Class with PULMONARY REHAB PM CLASS   PUL[facility]ARY LAB OP [facility] (--)    [address] [facility] [place] [id number] [phone]           From [address] take the [facility] exit (#[address]) and head west on [address].  Turn right on [address]. Park in the [facility] Parking garage, or you may use our  parking.  The [place] [facility] entrance is left of the  parking by the red and pink heart sculpture.              Problem List              ICD-10-CM Priority Class Noted - Resolved    Tobacco abuse Z72.0 Low  7/22/2014 - Present    Bipolar disorder (CMS-HCC) F31.9 Low  7/22/2014 - Present    COPD (chronic obstructive pulmonary disease) (CMS-HCC) J44.9   1/20/2016 - Present    Osteoarthritis of multiple joints M15.9   1/20/2016 - Present    Fibromyalgia M79.7   1/20/2016 - Present    S/P foot surgery Z98.890   1/20/2016 - Present    History of methamphetamine abuse Z87.898   1/20/2016 - Present    Nocturnal hypoxemia G47.34   2/28/2017 - Present    History of pneumonia Z87.01   2/28/2017 - Present      Health Maintenance        Date Due Completion Dates    COLONOSCOPY 11/20/2011 ---    MAMMOGRAM 12/13/2012 12/13/2011, 5/2/2006, 10/25/2004    PAP SMEAR 1/27/2019 1/27/2016, 1/27/2016    IMM DTaP/Tdap/Td Vaccine (2 - Td) 1/20/2026 1/20/2016            Current Immunizations     Pneumococcal polysaccharide vaccine (PPSV-23) 8/6/2014 10:13 AM    Tdap Vaccine 1/20/2016      Below and/or attached are the medications your  provider expects you to take. Review all of your home medications and newly ordered medications with your provider and/or pharmacist. Follow medication instructions as directed by your provider and/or pharmacist. Please keep your medication list with you and share with your provider. Update the information when medications are discontinued, doses are changed, or new medications (including over-the-counter products) are added; and carry medication information at all times in the event of emergency situations     Allergies:  PENICILLINS - Rash     LAMOTRIGINE - (reactions not documented)               Medications  Valid as of: April 26, 2017 -  3:13 PM    Generic Name Brand Name Tablet Size Instructions for use    Albuterol Sulfate (Aero Soln) albuterol 108 (90 BASE) MCG/ACT Inhale 2 Puffs by mouth every 6 hours as needed for Shortness of Breath.        Albuterol Sulfate (Aero Soln) albuterol 108 (90 BASE) MCG/ACT Inhale 2 Puffs by mouth every 6 hours as needed for Shortness of Breath.        Albuterol Sulfate (Aero Soln) albuterol 108 (90 BASE) MCG/ACT Inhale 2 Puffs by mouth every four hours as needed for Shortness of Breath.        Albuterol Sulfate (Aero Soln) albuterol 108 (90 BASE) MCG/ACT Inhale 2 Puffs by mouth every four hours as needed for Shortness of Breath (wheezing).        Albuterol Sulfate (Aero Soln) PROVENTIL  (90 BASE) MCG/ACT INHALE TWO PUFFS BY MOUTH EVERY FOUR HOURS AS NEEDED FOR SHORTNESS OF BREATH        ARIPiprazole (Tab) ABILIFY 5 MG Take 5 mg by mouth every day.        Budesonide-Formoterol Fumarate (Aerosol) SYMBICORT 160-4.5 MCG/ACT Inhale 2 Puffs by mouth 2 Times a Day.        Divalproex Sodium (TABLET SR 24 HR) DEPAKOTE  MG Take 500 mg by mouth every day.        Doxepin HCl (Cap) SINEQUAN 50 MG Take 100 mg by mouth every evening.        Gabapentin (Cap) NEURONTIN 300 MG Take 1 Cap by mouth 3 times a day.        Multiple Vitamin (Tab) THERAGRAN  Take 1 Tab by mouth every day.  Indications: **OTC**        Nicotine (PATCH 24 HR) NICODERM 21 MG/24HR Apply 1 Patch to skin as directed every day.        Pregabalin (Cap) LYRICA 75 MG Take 1 Cap by mouth 2 times a day.        RaNITidine HCl (Tab) ZANTAC 300 MG Take 300 mg by mouth 2 Times a Day.        Tiotropium Bromide Monohydrate (Aero Soln) Tiotropium Bromide Monohydrate 2.5 MCG/ACT Inhale 2 Inhalation by mouth every day. Assemble and prime.        .                 Medicines prescribed today were sent to:     [id number] - [place], [place] - [address] [place] [address]    Phone: [phone] Fax: [phone]    Open 24 Hours?: No      Medication refill instructions:       If your prescription bottle indicates you have medication refills left, it is not necessary to call your provider’s office. Please contact your pharmacy and they will refill your medication.    If your prescription bottle indicates you do not have any refills left, you may request refills at any time through one of the following ways: The online [employer]hawk system (except Urgent Care), by calling your provider’s office, or by asking your pharmacy to contact your provider’s office with a refill request. Medication refills are processed only during regular business hours and may not be available until the next business day. Your provider may request additional information or to have a follow-up visit with you prior to refilling your medication.   *Please Note: Medication refills are assigned a new Rx number when refilled electronically. Your pharmacy may indicate that no refills were authorized even though a new prescription for the same medication is available at the pharmacy. Please request the medicine by name with the pharmacy before contacting your provider for a refill.        Other Notes About Your Plan     UDS on 12/4/15: positive for methamphetamines. PLEASE DO NOT PRESCRIBE PATIENT WITH NARCOTICS.            Frame[employer]k Access Code:  [email or web address]  Expires: 5/26/2017  2:47 PM    CohesiveFT  A secure, online tool to manage your health information     [employer]’s CohesiveFT® is a secure, online tool that connects you to your personalized health information from the privacy of your home -- day or night - making it very easy for you to manage your healthcare. Once the activation process is completed, you can even access your medical information using the CohesiveFT jeni, which is available for free in the Apple Jeni store or Google Play store.     CohesiveFT provides the following levels of access (as shown below):   My Chart Features   [facility] Primary Care Doctor [facility]  Specialists [facility]  Urgent  Care Non-[facility]  Primary Care  Doctor   Email your healthcare team securely and privately 24/7 X X X    Manage appointments: schedule your next appointment; view details of past/upcoming appointments X      Request prescription refills. X      View recent personal medical records, including lab and immunizations X X X X   View health record, including health history, allergies, medications X X X X   Read reports about your outpatient visits, procedures, consult and ER notes X X X X   See your discharge summary, which is a recap of your hospital and/or ER visit that includes your diagnosis, lab results, and care plan. X X       How to register for CohesiveFT:  1. Go to  [email or web address].  2. Click on the Sign Up Now box, which takes you to the New Member Sign Up page. You will need to provide the following information:  a. Enter your CohesiveFT Access Code exactly as it appears at the top of this page. (You will not need to use this code after you’ve completed the sign-up process. If you do not sign up before the expiration date, you must request a new code.)   b. Enter your date of birth.   c. Enter your home email address.   d. Click Submit, and follow the next screen’s instructions.  3. Create a CohesiveFT ID. This will be your CohesiveFT login ID and cannot be  changed, so think of one that is secure and easy to remember.  4. Create a [employer] password. You can change your password at any time.  5. Enter your Password Reset Question and Answer. This can be used at a later time if you forget your password.   6. Enter your e-mail address. This allows you to receive e-mail notifications when new information is available in [employer].  7. Click Sign Up. You can now view your health information.    For assistance activating your Open [employer] account, call [phone]        Quit Tobacco Information     Do you want to quit using tobacco?    Quitting tobacco decreases risks of cancer, heart and lung disease, increases life expectancy, improves sense of taste and smell, and increases spending money, among other benefits.    If you are thinking about quitting, we can help.  • [facility] Quit Tobacco Program: 356.185.2665  o Program occurs weekly for four weeks and includes pharmacist consultation on products to support quitting smoking or chewing tobacco. A provider referral is needed for pharmacist consultation.  • Tobacco Users Help Hotline: 7-407-QUIT-NOW (735-4471) or https://[place].quitlogix.org/  o Free, confidential telephone and online coaching for [place] residents. Sessions are designed on a schedule that is convenient for you. Eligible clients receive free nicotine replacement therapy.  • Nationally: www.smokefree.gov  o Information and professional assistance to support both immediate and long-term needs as you become, and remain, a non-smoker. Smokefree.gov allows you to choose the help that best fits your needs.

## 2017-05-12 ENCOUNTER — TELEPHONE (OUTPATIENT)
Dept: HEMATOLOGY ONCOLOGY | Facility: MEDICAL CENTER | Age: 57
End: 2017-05-12

## 2017-05-12 NOTE — TELEPHONE ENCOUNTER
Called patient to prescreen for the lung cancer screening program.  Patient requested call back on Monday.

## 2017-05-26 ENCOUNTER — TELEPHONE (OUTPATIENT)
Dept: HEMATOLOGY ONCOLOGY | Facility: MEDICAL CENTER | Age: 57
End: 2017-05-26

## 2017-05-26 NOTE — TELEPHONE ENCOUNTER
Received referral to lung cancer screening program.  Chart review to assess for lung cancer screening program eligibility.   1. Age 55-77 yrs of age? Yes 56 y.o.  2. 30 pack year hx of smoking, or greater? Yes, patient states she has smoked since age 14. 1 huar46fee= 42 pkyr hx  3. Current smoker or if quit, has pt quit within last 15 yrs?Yes, patient is a current smoker    4. Any signs or symptoms of lung cancer? None noted, pt states she has an occasional cough.   5. Previous history of lung cancer? None noted  6. Chest CT within past 12 mos.? None noted  Patient does meet eligibility criteria. [facility] scheduling notified to schedule the shared decision making visit.

## 2017-07-07 RX ORDER — RANITIDINE 300 MG/1
300 TABLET ORAL 2 TIMES DAILY
Qty: 60 TAB | Refills: 0 | OUTPATIENT
Start: 2017-07-07

## 2017-07-26 ENCOUNTER — APPOINTMENT (OUTPATIENT)
Dept: PULMONOLOGY | Facility: HOSPICE | Age: 57
End: 2017-07-26
Payer: MEDICAID

## 2017-08-03 ENCOUNTER — OFFICE VISIT (OUTPATIENT)
Dept: HEMATOLOGY ONCOLOGY | Facility: MEDICAL CENTER | Age: 57
End: 2017-08-03
Payer: MEDICAID

## 2017-08-03 VITALS
RESPIRATION RATE: 22 BRPM | WEIGHT: 248.68 LBS | DIASTOLIC BLOOD PRESSURE: 64 MMHG | SYSTOLIC BLOOD PRESSURE: 114 MMHG | BODY MASS INDEX: 41.43 KG/M2 | OXYGEN SATURATION: 91 % | TEMPERATURE: 98.4 F | HEART RATE: 110 BPM | HEIGHT: 65 IN

## 2017-08-03 DIAGNOSIS — F17.210 CIGARETTE SMOKER: ICD-10-CM

## 2017-08-03 PROCEDURE — G0296 VISIT TO DETERM LDCT ELIG: HCPCS | Performed by: NURSE PRACTITIONER

## 2017-08-03 RX ORDER — NAPROXEN 500 MG/1
TABLET ORAL
Refills: 9 | COMMUNITY
Start: 2017-07-07

## 2017-08-03 ASSESSMENT — PATIENT HEALTH QUESTIONNAIRE - PHQ9
SUM OF ALL RESPONSES TO PHQ QUESTIONS 1-9: 5
CLINICAL INTERPRETATION OF PHQ2 SCORE: 1
5. POOR APPETITE OR OVEREATING: 1 - SEVERAL DAYS

## 2017-08-03 ASSESSMENT — ENCOUNTER SYMPTOMS
SHORTNESS OF BREATH: 1
WHEEZING: 1
WEIGHT LOSS: 0
SPUTUM PRODUCTION: 1
COUGH: 1
HEMOPTYSIS: 0

## 2017-08-03 NOTE — MR AVS SNAPSHOT
"        Pal Whiteside   8/3/2017 9:30 AM   Office Visit   MRN: [id number]    Department:  [facility]   Dept Phone:  [phone]    Description:  Female : 1960   Provider:  Glenny Youngblood A.P.N.           Reason for Visit     Lung Cancer Screening Program Prescreen Ref by Rachel Perez  Dx: Tobacco dependency, COPD.      Allergies as of 8/3/2017     Allergen Noted Reactions    Penicillins 08/15/2014   Rash    Lamotrigine 08/15/2014       \"Made me go cross-eyed.\"      You were diagnosed with     Cigarette smoker   [[id number]]         Vital Signs     Blood Pressure Pulse Temperature Respirations Height Weight    114/64 mmHg 110 36.9 °C (98.4 °F) 22 1.651 m (5' 5\") 112.8 kg (248 lb 10.9 oz)    Body Mass Index Oxygen Saturation Smoking Status             41.38 kg/m2 91% Current Every Day Smoker         Basic Information     Date Of Birth Sex Race Ethnicity Preferred Language    1960 Female White Non- English      Your appointments     Aug 04, 2017  9:20 AM   Established Patient Pul with DIPTI Serrato   [facility] (--)    [address] [facility] [address]  [facility] [address] [phone]              Problem List              ICD-10-CM Priority Class Noted - Resolved    Tobacco abuse Z72.0 Low  2014 - Present    Bipolar disorder (CMS-HCC) F31.9 Low  2014 - Present    COPD (chronic obstructive pulmonary disease) (CMS-HCC) J44.9   2016 - Present    Osteoarthritis of multiple joints M15.9   2016 - Present    Fibromyalgia M79.7   2016 - Present    S/P foot surgery Z98.890   2016 - Present    History of methamphetamine abuse Z87.898   2016 - Present    Nocturnal hypoxemia G47.34   2017 - Present    History of pneumonia Z87.01   2017 - Present      Health Maintenance        Date Due Completion Dates    COLONOSCOPY 2011 ---    MAMMOGRAM 2012, 2006, 10/25/2004    IMM INFLUENZA (1) 2017 " ---    PAP SMEAR 1/27/2019 1/27/2016, 1/27/2016    IMM DTaP/Tdap/Td Vaccine (2 - Td) 1/20/2026 1/20/2016            Current Immunizations     13-VALENT PCV PREVNAR 4/26/2017    Pneumococcal polysaccharide vaccine (PPSV-23) 8/6/2014 10:13 AM    Tdap Vaccine 1/20/2016      Below and/or attached are the medications your provider expects you to take. Review all of your home medications and newly ordered medications with your provider and/or pharmacist. Follow medication instructions as directed by your provider and/or pharmacist. Please keep your medication list with you and share with your provider. Update the information when medications are discontinued, doses are changed, or new medications (including over-the-counter products) are added; and carry medication information at all times in the event of emergency situations     Allergies:  PENICILLINS - Rash     LAMOTRIGINE - (reactions not documented)               Medications  Valid as of: August 03, 2017 - 10:30 AM    Generic Name Brand Name Tablet Size Instructions for use    Albuterol Sulfate (Aero Soln) albuterol 108 (90 BASE) MCG/ACT Inhale 2 Puffs by mouth every 6 hours as needed for Shortness of Breath.        Albuterol Sulfate (Aero Soln) albuterol 108 (90 BASE) MCG/ACT Inhale 2 Puffs by mouth every 6 hours as needed for Shortness of Breath.        Albuterol Sulfate (Aero Soln) albuterol 108 (90 BASE) MCG/ACT Inhale 2 Puffs by mouth every four hours as needed for Shortness of Breath.        Albuterol Sulfate (Aero Soln) albuterol 108 (90 BASE) MCG/ACT Inhale 2 Puffs by mouth every four hours as needed for Shortness of Breath (wheezing).        Albuterol Sulfate (Aero Soln) PROVENTIL  (90 BASE) MCG/ACT INHALE TWO PUFFS BY MOUTH EVERY FOUR HOURS AS NEEDED FOR SHORTNESS OF BREATH        Albuterol Sulfate (Aero Soln) albuterol 108 (90 BASE) MCG/ACT Inhale 2 Puffs by mouth every four hours as needed for Shortness of Breath (wheezing).        Albuterol  Sulfate (Nebu Soln) PROVENTIL 2.5mg/3ml 3 mL by Nebulization route every four hours as needed for Shortness of Breath.        ARIPiprazole (Tab) ABILIFY 5 MG Take 5 mg by mouth every day.        Budesonide-Formoterol Fumarate (Aerosol) SYMBICORT 160-4.5 MCG/ACT Inhale 2 Puffs by mouth 2 Times a Day.        Divalproex Sodium (TABLET SR 24 HR) DEPAKOTE  MG Take 500 mg by mouth every day.        Doxepin HCl (Cap) SINEQUAN 50 MG Take 100 mg by mouth every evening.        Gabapentin (Cap) NEURONTIN 300 MG Take 1 Cap by mouth 3 times a day.        Multiple Vitamin (Tab) THERAGRAN  Take 1 Tab by mouth every day. Indications: **OTC**        Naproxen (Tab) NAPROSYN 500 MG         Nicotine (PATCH 24 HR) NICODERM 21 MG/24HR Apply 1 Patch to skin as directed every day.        Pregabalin (Cap) LYRICA 75 MG Take 1 Cap by mouth 2 times a day.        RaNITidine HCl (Tab) ZANTAC 300 MG Take 300 mg by mouth 2 Times a Day.        Tiotropium Bromide Monohydrate (Aero Soln) Tiotropium Bromide Monohydrate 2.5 MCG/ACT Inhale 2 Inhalation by mouth every day. Assemble and prime.        .                 Medicines prescribed today were sent to:     [facility] [place] [address] [facility] [address]    Phone: [phone] Fax: [phone]    Open 24 Hours?: No      Medication refill instructions:       If your prescription bottle indicates you have medication refills left, it is not necessary to call your provider’s office. Please contact your pharmacy and they will refill your medication.    If your prescription bottle indicates you do not have any refills left, you may request refills at any time through one of the following ways: The online [employer] system (except Urgent Care), by calling your provider’s office, or by asking your pharmacy to contact your provider’s office with a refill request. Medication refills are processed only during regular business hours and may not be  available until the next business day. Your provider may request additional information or to have a follow-up visit with you prior to refilling your medication.   *Please Note: Medication refills are assigned a new Rx number when refilled electronically. Your pharmacy may indicate that no refills were authorized even though a new prescription for the same medication is available at the pharmacy. Please request the medicine by name with the pharmacy before contacting your provider for a refill.        Your To Do List     Future Labs/Procedures Complete By Expires    CT-LUNG CANCER-SCREENING  As directed 8/3/2018      Other Notes About Your Plan     UDS on 12/4/15: positive for methamphetamines. PLEASE DO NOT PRESCRIBE PATIENT WITH NARCOTICS.            [employer] Access Code: [email or web address]  Expires: 8/30/2017  4:49 PM    [employer]  A secure, online tool to manage your health information     [employer]’s [employer]® is a secure, online tool that connects you to your personalized health information from the privacy of your home -- day or night - making it very easy for you to manage your healthcare. Once the activation process is completed, you can even access your medical information using the [employer] jeni, which is available for free in the Apple Jeni store or Google Play store.     [employer] provides the following levels of access (as shown below):   My Chart Features   [facility] Primary Care Doctor [facility]  Specialists [facility]  Urgent  Care Non-[facility]  Primary Care  Doctor   Email your healthcare team securely and privately 24/7 X X X    Manage appointments: schedule your next appointment; view details of past/upcoming appointments X      Request prescription refills. X      View recent personal medical records, including lab and immunizations X X X X   View health record, including health history, allergies, medications X X X X   Read reports about your outpatient visits, procedures, consult and ER notes X X X X   See your  discharge summary, which is a recap of your hospital and/or ER visit that includes your diagnosis, lab results, and care plan. X X       How to register for [employer]:  1. Go to  [email or web address].  2. Click on the Sign Up Now box, which takes you to the New Member Sign Up page. You will need to provide the following information:  a. Enter your Z[employer]ie Access Code exactly as it appears at the top of this page. (You will not need to use this code after you’ve completed the sign-up process. If you do not sign up before the expiration date, you must request a new code.)   b. Enter your date of birth.   c. Enter your home email address.   d. Click Submit, and follow the next screen’s instructions.  3. Create a Zweemie ID. This will be your Zwe[employer]ie login ID and cannot be changed, so think of one that is secure and easy to remember.  4. Create a Z[employer]emie password. You can change your password at any time.  5. Enter your Password Reset Question and Answer. This can be used at a later time if you forget your password.   6. Enter your e-mail address. This allows you to receive e-mail notifications when new information is available in Zweemie.  7. Click Sign Up. You can now view your health information.    For assistance activating your Z[employer]ie account, call [phone]        Quit Tobacco Information     Do you want to quit using tobacco?    Quitting tobacco decreases risks of cancer, heart and lung disease, increases life expectancy, improves sense of taste and smell, and increases spending money, among other benefits.    If you are thinking about quitting, we can help.  • [facility] Quit Tobacco Program: 364.794.1307  o Program occurs weekly for four weeks and includes pharmacist consultation on products to support quitting smoking or chewing tobacco. A provider referral is needed for pharmacist consultation.  • Tobacco Users Help Hotline: -800-QUIT-NOW (777-9135) or https://[place].quitlogix.org/  o Free,  confidential telephone and online coaching for [place] residents. Sessions are designed on a schedule that is convenient for you. Eligible clients receive free nicotine replacement therapy.  • Nationally: www.smokefree.gov  o Information and professional assistance to support both immediate and long-term needs as you become, and remain, a non-smoker. Smokefree.gov allows you to choose the help that best fits your needs.

## 2017-08-03 NOTE — PROGRESS NOTES
"Subjective:      Pal Whitesdie is a 56 y.o. female who presents with Lung Cancer Screening Program Prescreen  for lung cancer screening shared decision making visit.         HPI    Patient seen today for initial lung cancer screening visit. Patient referred by his pulmonary provider, BERTIN Martins. PCP is at [facility].     The patient meets eligibility criteria including age, smoking history (30+ pack years), if former smoker, quit in the last 15 years, and absence of signs or symptoms of lung cancer.    - Age - 56  - Smoking history - Patient has smoked for 42 years at an average of 0.75 ppd = 31.5 pack year smoking history.  - Current smoking status - Current smoker  - No symptoms of lung cancer and no previous history of lung cancer     Allergies   Allergen Reactions   • Penicillins Rash   • Lamotrigine      \"Made me go cross-eyed.\"     Current Outpatient Prescriptions on File Prior to Visit   Medication Sig Dispense Refill   • budesonide-formoterol (SYMBICORT) 160-4.5 MCG/ACT Aerosol Inhale 2 Puffs by mouth 2 Times a Day. 1 Inhaler 5   • Tiotropium Bromide Monohydrate (SPIRIVA RESPIMAT) 2.5 MCG/ACT Aero Soln Inhale 2 Inhalation by mouth every day. Assemble and prime. 1 Inhaler 5   • albuterol (PROAIR HFA) 108 (90 BASE) MCG/ACT Aero Soln inhalation aerosol Inhale 2 Puffs by mouth every four hours as needed for Shortness of Breath (wheezing). 1 Inhaler 5   • divalproex ER (DEPAKOTE ER) 500 MG TABLET SR 24 HR Take 500 mg by mouth every day.     • gabapentin (NEURONTIN) 300 MG Cap Take 1 Cap by mouth 3 times a day. 90 Cap 3   • doxepin (SINEQUAN) 50 MG CAPS Take 100 mg by mouth every evening.     • ranitidine (ZANTAC) 300 MG tablet Take 300 mg by mouth 2 Times a Day.     • aripiprazole (ABILIFY) 5 MG tablet Take 5 mg by mouth every day.     • albuterol (PROAIR HFA) 108 (90 BASE) MCG/ACT Aero Soln inhalation aerosol Inhale 2 Puffs by mouth every four hours as needed for Shortness of Breath (wheezing). " 1 Inhaler 5   • albuterol (PROVENTIL) 2.5mg/3ml Nebu Soln solution for nebulization 3 mL by Nebulization route every four hours as needed for Shortness of Breath. 75 mL 5   • PROVENTIL  (90 BASE) MCG/ACT Aero Soln inhalation aerosol INHALE TWO PUFFS BY MOUTH EVERY FOUR HOURS AS NEEDED FOR SHORTNESS OF BREATH 1 Inhaler 6   • albuterol 108 (90 BASE) MCG/ACT Aero Soln inhalation aerosol Inhale 2 Puffs by mouth every four hours as needed for Shortness of Breath. 1 Inhaler 1   • albuterol 108 (90 BASE) MCG/ACT Aero Soln inhalation aerosol Inhale 2 Puffs by mouth every 6 hours as needed for Shortness of Breath.     • albuterol 108 (90 BASE) MCG/ACT Aero Soln inhalation aerosol Inhale 2 Puffs by mouth every 6 hours as needed for Shortness of Breath.     • pregabalin (LYRICA) 75 MG Cap Take 1 Cap by mouth 2 times a day. (Patient not taking: Reported on 4/26/2017) 60 Cap 3   • multivitamin (THERAGRAN) TABS Take 1 Tab by mouth every day. Indications: **OTC**     • nicotine (NICODERM) 21 MG/24HR PT24 Apply 1 Patch to skin as directed every day. 30 Patch 0     No current facility-administered medications on file prior to visit.       Review of Systems   Constitutional: Negative for weight loss (weight does fluctuate) and malaise/fatigue.   Respiratory: Positive for cough (present now d/t smoke in the air - not standard symptom for patient), sputum production, shortness of breath (with activity) and wheezing (once in a while, but not all the time). Negative for hemoptysis.         She is on O2 at night          Objective:     There were no vitals taken for this visit.     Physical Exam   Constitutional: She is oriented to person, place, and time. She appears well-developed and well-nourished. No distress.   HENT:   Head: Normocephalic and atraumatic.   Cardiovascular: Regular rhythm and normal heart sounds.  Exam reveals no gallop and no friction rub.    No murmur heard.  Tachycardic today. Arrived to appointment late and  was rushing to the clinic.    Pulmonary/Chest: Effort normal. No respiratory distress. She has no wheezes.   Diminished throughout   Musculoskeletal: Normal range of motion.   Neurological: She is alert and oriented to person, place, and time.   Skin: Skin is warm and dry. She is not diaphoretic.   Vitals reviewed.              Assessment/Plan:     1. Cigarette smoker  CT-LUNG CANCER-SCREENING         We conducted a shared decision-making process using a decision aid. We reviewed benefits and harms of screening, including false positives and potential need for additional diagnostic testing, the possibility of over diagnosis, and total radiation exposure.    We discussed the importance of adhering to annual LDCT screening. We also discussed the impact of comorbities on the patient's the ability or willingness to undergo diagnostic procedure(s) and treatment.    Counseling on the importance of maintaining cigarette smoking abstinence if former smoker; or the importance of smoking cessation if current smoker and, if appropriate, furnishing of information about tobacco cessation interventions. I provided patient with smoking cessation materials and resources within [facility] and the community. Patient appreciative of the resources.     Based on our discussion, we have decided to begin annual lung cancer screening starting now.

## 2017-08-04 ENCOUNTER — OFFICE VISIT (OUTPATIENT)
Dept: PULMONOLOGY | Facility: HOSPICE | Age: 57
End: 2017-08-04
Payer: MEDICAID

## 2017-08-04 VITALS
OXYGEN SATURATION: 93 % | TEMPERATURE: 98.1 F | WEIGHT: 242 LBS | RESPIRATION RATE: 16 BRPM | BODY MASS INDEX: 40.32 KG/M2 | SYSTOLIC BLOOD PRESSURE: 140 MMHG | DIASTOLIC BLOOD PRESSURE: 92 MMHG | HEIGHT: 65 IN | HEART RATE: 118 BPM

## 2017-08-04 DIAGNOSIS — J44.9 CHRONIC OBSTRUCTIVE PULMONARY DISEASE, UNSPECIFIED COPD TYPE (HCC): ICD-10-CM

## 2017-08-04 DIAGNOSIS — Z71.6 TOBACCO ABUSE COUNSELING: ICD-10-CM

## 2017-08-04 DIAGNOSIS — G47.33 OSA (OBSTRUCTIVE SLEEP APNEA): ICD-10-CM

## 2017-08-04 DIAGNOSIS — Z72.0 TOBACCO ABUSE: ICD-10-CM

## 2017-08-04 DIAGNOSIS — G47.34 NOCTURNAL HYPOXEMIA: ICD-10-CM

## 2017-08-04 PROCEDURE — 99213 OFFICE O/P EST LOW 20 MIN: CPT | Performed by: NURSE PRACTITIONER

## 2017-08-04 RX ORDER — RANITIDINE 150 MG/1
TABLET ORAL
Refills: 0 | COMMUNITY
Start: 2017-07-07

## 2017-08-04 RX ORDER — TIOTROPIUM BROMIDE 18 UG/1
CAPSULE ORAL; RESPIRATORY (INHALATION)
Qty: 30 CAP | Refills: 6 | Status: SHIPPED | OUTPATIENT
Start: 2017-08-04 | End: 2018-02-17 | Stop reason: SDUPTHER

## 2017-08-04 RX ORDER — GABAPENTIN 600 MG/1
TABLET ORAL
Refills: 0 | COMMUNITY
Start: 2017-07-07

## 2017-08-04 RX ORDER — METHYLPREDNISOLONE 4 MG/1
TABLET ORAL
Qty: 21 TAB | Refills: 0 | Status: SHIPPED | OUTPATIENT
Start: 2017-08-04

## 2017-08-04 RX ORDER — AZITHROMYCIN 250 MG/1
TABLET, FILM COATED ORAL
Qty: 6 TAB | Refills: 0 | Status: SHIPPED | OUTPATIENT
Start: 2017-08-04

## 2017-08-04 RX ORDER — BUDESONIDE AND FORMOTEROL FUMARATE DIHYDRATE 160; 4.5 UG/1; UG/1
AEROSOL RESPIRATORY (INHALATION)
Qty: 1 INHALER | Refills: 6 | Status: SHIPPED | OUTPATIENT
Start: 2017-08-04 | End: 2018-02-01 | Stop reason: SDUPTHER

## 2017-08-04 NOTE — PROGRESS NOTES
CC:  Here for f/u pulmonary issues as listed below    HPI:   Pal presents today for follow up for COPD/Asthma. History of pneumonia in 2014 requiring intubation. PFTs from 4/2017 indicate a Fev1 of 1.74L or 69% predicted with a positive bronchodilator response, Fev1/FVC ratio of 70, DLCO 110%.  She is compliant with Symbicort 160/4.5 µg 2 puffs, twice daily with mouth rinse, Spiriva daily, and ProAir 5-10 times per week with sickness, prior was 1-2x/week. Insurance switched her to Proventil HFA and she does not feel this is as effective. Prior auth approved Proair through 3/2018. . C/o better dyspnea since moving to [place]. Smoke in air causing increase in SOB. C/o occasional cough with green sputum x 2 weeks, that is typical color for her. Occasional wheezing, nasal congestion that use nasal spray. Denies hemoptysis, fever and chills, chest pain, chest tightness, palpitations, acid reflux.    She continues to smoke 10/day. She is interested in quitting. She has already been referred to [facility] smoking cessation program, but had not followed up. Not interested for additional referral. was referred to the lung cancer screening program last office visit and had an appointment with them yesterday. She met criteria. She has not completed her cat scan yet.     She uses 2.5L oxygen nightly. CNOX completed July 2017 and 2-1/2 L at night shows time below 88% of 72.9 minutes with a basal of 89.4% and low as 78%. It does show some clustering of oxygen and heart rate. A sleep study is recommended.  Reviewed pathophysiology of untreated JONATHAN including cardiac and neurologic risk factors. Patient is amendable to home sleep study. She reports she has completed sleep studies in the past but has not followed through with therapy.  Patient is currently sleeping 8 hours per night with 2x nighttime awakenings. They have occasional trouble falling asleep. She takes sleeping aid and pain med doxepin to help facilitate sleep.   They  sometimes feel refreshed in the morning and denies morning H/A. They occasionally feel tired throughout the day and naps 2-3x/ per week for appx 2-3 hours long.  Patient reports snoring. Denies apnea events and paroxysmal nocturnal dyspnea events. They have never fallen asleep in conversation, at the wheel, or at work.  They deny sleepwalking/talking.       Patient Active Problem List    Diagnosis Date Noted   • Tobacco abuse 07/22/2014     Priority: Low   • Bipolar disorder (CMS-HCC) 07/22/2014     Priority: Low   • Tobacco abuse counseling 08/04/2017   • Nocturnal hypoxemia 02/28/2017   • History of pneumonia 02/28/2017   • COPD (chronic obstructive pulmonary disease) (CMS-HCC) 01/20/2016   • Osteoarthritis of multiple joints 01/20/2016   • Fibromyalgia 01/20/2016   • S/P foot surgery 01/20/2016   • History of methamphetamine abuse 01/20/2016       Past Medical History   Diagnosis Date   • Arthritis      right knee   • EMPHYSEMA    • Breath shortness      occasional   • Dental disorder      upper and lower dentures   • Pain      right foot   • Psychiatric problem      Bipolar   • JONATHAN (obstructive sleep apnea)      Suspected       Past Surgical History   Procedure Laterality Date   • Umbilical hernia repair child  as child   • Foot surgery  1997     reconstruction, right foot surgery times three   • Biopsy ortho  10/4/2010     Performed by ANDREY PARK at SURGERY [facility]   • Loose body removal  10/4/2010     Performed by ANDREY PARK at SURGERY [facility]       Family History   Problem Relation Age of Onset   • Diabetes     • Hypertension Mother    • Heart Disease Father    • Lung Disease Father    • Diabetes Brother      Type I DM       Social History   Substance Use Topics   • Smoking status: Current Every Day Smoker -- 0.75 packs/day for 42 years     Types: Cigarettes   • Smokeless tobacco: Never Used   • Alcohol Use: 1.2 oz/week     2 Standard drinks or equivalent per week       Comment: occasional, 2-4 drinks, mixed drinks       Current Outpatient Prescriptions   Medication Sig Dispense Refill   • naproxen (NAPROSYN) 500 MG Tab   9   • budesonide-formoterol (SYMBICORT) 160-4.5 MCG/ACT Aerosol Inhale 2 Puffs by mouth 2 Times a Day. 1 Inhaler 5   • Tiotropium Bromide Monohydrate (SPIRIVA RESPIMAT) 2.5 MCG/ACT Aero Soln Inhale 2 Inhalation by mouth every day. Assemble and prime. 1 Inhaler 5   • albuterol (PROAIR HFA) 108 (90 BASE) MCG/ACT Aero Soln inhalation aerosol Inhale 2 Puffs by mouth every four hours as needed for Shortness of Breath (wheezing). 1 Inhaler 5   • divalproex ER (DEPAKOTE ER) 500 MG TABLET SR 24 HR Take 500 mg by mouth every day.     • gabapentin (NEURONTIN) 300 MG Cap Take 1 Cap by mouth 3 times a day. 90 Cap 3   • doxepin (SINEQUAN) 50 MG CAPS Take 100 mg by mouth every evening.     • ranitidine (ZANTAC) 300 MG tablet Take 300 mg by mouth 2 Times a Day.     • aripiprazole (ABILIFY) 5 MG tablet Take 5 mg by mouth every day.     • gabapentin (NEURONTIN) 600 MG tablet   0   • ranitidine (ZANTAC) 150 MG Tab   0   • albuterol (PROAIR HFA) 108 (90 BASE) MCG/ACT Aero Soln inhalation aerosol Inhale 2 Puffs by mouth every four hours as needed for Shortness of Breath (wheezing). 1 Inhaler 5   • albuterol (PROVENTIL) 2.5mg/3ml Nebu Soln solution for nebulization 3 mL by Nebulization route every four hours as needed for Shortness of Breath. 75 mL 5   • PROVENTIL  (90 BASE) MCG/ACT Aero Soln inhalation aerosol INHALE TWO PUFFS BY MOUTH EVERY FOUR HOURS AS NEEDED FOR SHORTNESS OF BREATH 1 Inhaler 6   • albuterol 108 (90 BASE) MCG/ACT Aero Soln inhalation aerosol Inhale 2 Puffs by mouth every four hours as needed for Shortness of Breath. 1 Inhaler 1   • albuterol 108 (90 BASE) MCG/ACT Aero Soln inhalation aerosol Inhale 2 Puffs by mouth every 6 hours as needed for Shortness of Breath.     • albuterol 108 (90 BASE) MCG/ACT Aero Soln inhalation aerosol Inhale 2 Puffs by mouth  "every 6 hours as needed for Shortness of Breath.     • multivitamin (THERAGRAN) TABS Take 1 Tab by mouth every day. Indications: **OTC**     • nicotine (NICODERM) 21 MG/24HR PT24 Apply 1 Patch to skin as directed every day. 30 Patch 0     No current facility-administered medications for this visit.          Allergies: Penicillins and Lamotrigine          ROS   Gen: Denies fever, chills, unintentional weight loss, fatigue, night sweats  E/N/T: Denies nasal congestion, ear pain  Resp:Denies wheezing, hemoptysis  CV: Denies chest pain, chest tightness, palpitations  Sleep:Denies morning headache  Neuro: Denies frequent headaches, weakness, dizziness  GI: Denies acid reflux, N/V  See HPI.  All other systems reviewed and negative          Vital signs for this encounter:  Filed Vitals:    08/04/17 0922   Height: 1.651 m (5' 5\")   Weight: 109.77 kg (242 lb)   Weight % change since last entry.: 0 %   BP: 160/74   Pulse: 118   BMI (Calculated): 40.27   Resp: 16   Temp: 36.7 °C (98.1 °F)   O2 sat % room air: 93 %                 Physical Exam:   Appearance: well developed, well nourished, no acute distress.   Eyes: PERRL, EOM intact, sclere white, conjunctiva moist.  Ears: no lesions or deformities.  Hearing: grossly intact.  Nose: no lesions or deformities.  Dentition: good dentition.   Oropharynx: tongue normal, posterior pharynx without erythema or exudate.  Neck: supple, trachea midline, no masses.  Respiratory effort: no intercostal retractions or use of accessory muscles.  Lung auscultation: Bilateral diminished   Heart auscultation: no murmur, rub, or gallop   Extremities: no cyanosis or edema.  Abdomen: soft, non-tender, no masses.  Gait and station: without difficulty   Digits and Nails: no clubbing, cyanosis, petechiae, or nodes.  Cranial nerves: grossly normal.  Motor: no focal deficits observed.   Skin: no rashes, lesions, or ulcers noted.  Orientation: oriented to time, place, and person.  Mood and affect: mood " and affect appropriate, normal interaction with examiner.      Assessment   1. Tobacco abuse     2. Chronic obstructive pulmonary disease, unspecified COPD type (CMS-HCC)     3. Nocturnal hypoxemia     4. Tobacco abuse counseling         Patient was seen for 35 minutes, more than 50% of time spent in face to face review, counseling, and arranging future evaluation and follow up of medical conditions and care.     PLAN:   Patient Instructions   1) Continue Symbicort 160, Spiriva, rescue inhaler   2) continue nocturnal oxygen at 2.5 L  3) Home sleep study   4) Medrol pack and zpack for exacerbation  4) Vaccines: Up to date with Prevnar 13 and Pneumovax 23  5) Return in about 3 months (around 11/4/2017), or or Adriana Perez, for Compliance, review of symptoms, if not sooner, follow up with BERTIN Feliz.   6) Smoking cessation encouraged

## 2017-08-04 NOTE — MR AVS SNAPSHOT
"        Pal Whiteside   2017 9:20 AM   Office Visit   MRN: [id number]    Department:  [facility]   Dept Phone:  [phone]    Description:  Female : 1960   Provider:  DIPTI Serrato           Reason for Visit     Follow-Up CT,CNOX results      Allergies as of 2017     Allergen Noted Reactions    Penicillins 08/15/2014   Rash    Lamotrigine 08/15/2014       \"Made me go cross-eyed.\"      You were diagnosed with     Tobacco abuse   [846748]       Chronic obstructive pulmonary disease, unspecified COPD type (CMS-HCC)   [9399595]       Nocturnal hypoxemia   [751274]       Tobacco abuse counseling   [109610]       JONATHAN (obstructive sleep apnea)   [345328]         Vital Signs     Blood Pressure Pulse Temperature Respirations Height Weight    140/92 mmHg 118 36.7 °C (98.1 °F) 16 1.651 m (5' 5\") 109.77 kg (242 lb)    Body Mass Index Oxygen Saturation Smoking Status             40.27 kg/m2 93% Current Every Day Smoker         Basic Information     Date Of Birth Sex Race Ethnicity Preferred Language    1960 Female White Non- English      Your appointments     2017  4:20 PM   Established Patient Pul with DIPTI Serrato   [facility] (--)    [address] [place] [id number] [phone]              Problem List              ICD-10-CM Priority Class Noted - Resolved    Tobacco abuse Z72.0 Low  2014 - Present    Bipolar disorder (CMS-HCC) F31.9 Low  2014 - Present    COPD (chronic obstructive pulmonary disease) (CMS-HCC) J44.9   2016 - Present    Osteoarthritis of multiple joints M15.9   2016 - Present    Fibromyalgia M79.7   2016 - Present    S/P foot surgery Z98.890   2016 - Present    History of methamphetamine abuse Z87.898   2016 - Present    Nocturnal hypoxemia G47.34   2017 - Present    History of pneumonia Z87.01   2017 - Present    Tobacco abuse counseling Z71.6   " 8/4/2017 - Present      Health Maintenance        Date Due Completion Dates    COLONOSCOPY 11/20/2011 ---    MAMMOGRAM 12/13/2012 12/13/2011, 5/2/2006, 10/25/2004    IMM INFLUENZA (1) 9/1/2017 ---    PAP SMEAR 1/27/2019 1/27/2016, 1/27/2016    IMM DTaP/Tdap/Td Vaccine (2 - Td) 1/20/2026 1/20/2016            Current Immunizations     13-VALENT PCV PREVNAR 4/26/2017    Pneumococcal polysaccharide vaccine (PPSV-23) 8/6/2014 10:13 AM    Tdap Vaccine 1/20/2016      Below and/or attached are the medications your provider expects you to take. Review all of your home medications and newly ordered medications with your provider and/or pharmacist. Follow medication instructions as directed by your provider and/or pharmacist. Please keep your medication list with you and share with your provider. Update the information when medications are discontinued, doses are changed, or new medications (including over-the-counter products) are added; and carry medication information at all times in the event of emergency situations     Allergies:  PENICILLINS - Rash     LAMOTRIGINE - (reactions not documented)               Medications  Valid as of: August 04, 2017 -  9:55 AM    Generic Name Brand Name Tablet Size Instructions for use    Albuterol Sulfate (Aero Soln) albuterol 108 (90 BASE) MCG/ACT Inhale 2 Puffs by mouth every 6 hours as needed for Shortness of Breath.        Albuterol Sulfate (Aero Soln) albuterol 108 (90 BASE) MCG/ACT Inhale 2 Puffs by mouth every 6 hours as needed for Shortness of Breath.        Albuterol Sulfate (Aero Soln) albuterol 108 (90 BASE) MCG/ACT Inhale 2 Puffs by mouth every four hours as needed for Shortness of Breath.        Albuterol Sulfate (Aero Soln) albuterol 108 (90 BASE) MCG/ACT Inhale 2 Puffs by mouth every four hours as needed for Shortness of Breath (wheezing).        Albuterol Sulfate (Aero Soln) PROVENTIL  (90 BASE) MCG/ACT INHALE TWO PUFFS BY MOUTH EVERY FOUR HOURS AS NEEDED FOR  SHORTNESS OF BREATH        Albuterol Sulfate (Aero Soln) albuterol 108 (90 BASE) MCG/ACT Inhale 2 Puffs by mouth every four hours as needed for Shortness of Breath (wheezing).        ARIPiprazole (Tab) ABILIFY 5 MG Take 5 mg by mouth every day.        Azithromycin (Tab) ZITHROMAX 250 MG Take 2 tablets on day 1, then take 1 tablet a day for 4 days.        Budesonide-Formoterol Fumarate (Aerosol) SYMBICORT 160-4.5 MCG/ACT Inhale 2 Puffs by mouth 2 Times a Day.        Divalproex Sodium (TABLET SR 24 HR) DEPAKOTE  MG Take 500 mg by mouth every day.        Doxepin HCl (Cap) SINEQUAN 50 MG Take 100 mg by mouth every evening.        Gabapentin (Cap) NEURONTIN 300 MG Take 1 Cap by mouth 3 times a day.        Gabapentin (Tab) NEURONTIN 600 MG         MethylPREDNISolone (Tablet Therapy Pack) MEDROL DOSEPAK 4 MG Take as directed.        Multiple Vitamin (Tab) THERAGRAN  Take 1 Tab by mouth every day. Indications: **OTC**        Naproxen (Tab) NAPROSYN 500 MG         Nicotine (PATCH 24 HR) NICODERM 21 MG/24HR Apply 1 Patch to skin as directed every day.        RaNITidine HCl (Tab) ZANTAC 300 MG Take 300 mg by mouth 2 Times a Day.        RaNITidine HCl (Tab) ZANTAC 150 MG         Tiotropium Bromide Monohydrate (Aero Soln) Tiotropium Bromide Monohydrate 2.5 MCG/ACT Inhale 2 Inhalation by mouth every day. Assemble and prime.        .                 Medicines prescribed today were sent to:     [facility] [address] [address] [address] [facility] [address]    Phone: [phone] Fax: [phone]    Open 24 Hours?: No      Medication refill instructions:       If your prescription bottle indicates you have medication refills left, it is not necessary to call your provider’s office. Please contact your pharmacy and they will refill your medication.    If your prescription bottle indicates you do not have any refills left, you may request refills at any time through one of the following  ways: The online [employer] system (except Urgent Care), by calling your provider’s office, or by asking your pharmacy to contact your provider’s office with a refill request. Medication refills are processed only during regular business hours and may not be available until the next business day. Your provider may request additional information or to have a follow-up visit with you prior to refilling your medication.   *Please Note: Medication refills are assigned a new Rx number when refilled electronically. Your pharmacy may indicate that no refills were authorized even though a new prescription for the same medication is available at the pharmacy. Please request the medicine by name with the pharmacy before contacting your provider for a refill.        Your To Do List     Future Labs/Procedures Complete By Expires    OVERNIGHT HOME SLEEP STUDY  As directed 8/4/2018      Instructions    1) Continue Symbicort 160, Spiriva, rescue inhaler   2) continue nocturnal oxygen at 2.5 L  3) Home sleep study   4) Medrol pack and zpack for exacerbation  4) Vaccines: Up to date with Prevnar 13 and Pneumovax 23  5) Return in about 3 months (around 11/4/2017), or or Adriana Perez, for Compliance, review of symptoms, if not sooner, follow up with BERTIN Feliz.   6) Smoking cessation encouraged           Other Notes About Your Plan     UDS on 12/4/15: positive for methamphetamines. PLEASE DO NOT PRESCRIBE PATIENT WITH NARCOTICS.            [employer] Access Code: [email or web address]  Expires: 8/30/2017  4:49 PM    [employer]  A secure, online tool to manage your health information     [employer]’s [employer]® is a secure, online tool that connects you to your personalized health information from the privacy of your home -- day or night - making it very easy for you to manage your healthcare. Once the activation process is completed, you can even access your medical information using the [employer] james, which is available for free in the  Apple Jeni store or Google Play store.     [email or web address] provides the following levels of access (as shown below):   My Chart Features   [facility] Doctor [facility]  Specialists [facility]  Urgent  Care [facility]  Primary Care  Doctor   Email your healthcare team securely and privately 24/7 X X X    Manage appointments: schedule your next appointment; view details of past/upcoming appointments X      Request prescription refills. X      View recent personal medical records, including lab and immunizations X X X X   View health record, including health history, allergies, medications X X X X   Read reports about your outpatient visits, procedures, consult and ER notes X X X X   See your discharge summary, which is a recap of your hospital and/or ER visit that includes your diagnosis, lab results, and care plan. X X       How to register for [email or web address]:  1. Go to  [email or web address].  2. Click on the Sign Up Now box, which takes you to the New Member Sign Up page. You will need to provide the following information:  a. Enter your [employer] Access Code exactly as it appears at the top of this page. (You will not need to use this code after you’ve completed the sign-up process. If you do not sign up before the expiration date, you must request a new code.)   b. Enter your date of birth.   c. Enter your home email address.   d. Click Submit, and follow the next screen’s instructions.  3. Create a [employer] ID. This will be your [employer] login ID and cannot be changed, so think of one that is secure and easy to remember.  4. Create a [employer] password. You can change your password at any time.  5. Enter your Password Reset Question and Answer. This can be used at a later time if you forget your password.   6. Enter your e-mail address. This allows you to receive e-mail notifications when new information is available in [employer].  7. Click Sign Up. You can now view your health information.    For assistance activating your  [employer] account, call [phone]        Quit Tobacco Information     Do you want to quit using tobacco?    Quitting tobacco decreases risks of cancer, heart and lung disease, increases life expectancy, improves sense of taste and smell, and increases spending money, among other benefits.    If you are thinking about quitting, we can help.  • [facility] Quit Tobacco Program: 880.946.8958  o Program occurs weekly for four weeks and includes pharmacist consultation on products to support quitting smoking or chewing tobacco. A provider referral is needed for pharmacist consultation.  • Tobacco Users Help Hotline: 2-800-QUIT-NOW (438-9623) or https://[place].quitlogix.org/  o Free, confidential telephone and online coaching for [place] residents. Sessions are designed on a schedule that is convenient for you. Eligible clients receive free nicotine replacement therapy.  • Nationally: www.smokefree.gov  o Information and professional assistance to support both immediate and long-term needs as you become, and remain, a non-smoker. Smokefree.gov allows you to choose the help that best fits your needs.

## 2017-08-04 NOTE — TELEPHONE ENCOUNTER
Have we ever prescribed this med? Yes.  If yes, what date? 02/28/2017    Last OV: 08/04/2017 - Christi Hinojosa    Next OV: 11/02/2017 - Christi Hinojosa    DX: COPD    Medications: Spiriva, Symbicort

## 2017-08-04 NOTE — PATIENT INSTRUCTIONS
1) Continue Symbicort 160, Spiriva, rescue inhaler   2) continue nocturnal oxygen at 2.5 L  3) Home sleep study ordered  4) Medrol pack and zpack for exacerbation  4) Vaccines: Up to date with Prevnar 13 and Pneumovax 23  5) Return in about 3 months (around 11/4/2017), or or Adriana Perez, for Compliance, review of symptoms, if not sooner, follow up with BERTIN Feliz.   6) Smoking cessation encouraged

## 2017-08-10 ENCOUNTER — TELEPHONE (OUTPATIENT)
Dept: PULMONOLOGY | Facility: HOSPICE | Age: 57
End: 2017-08-10

## 2017-08-10 DIAGNOSIS — J44.9 CHRONIC OBSTRUCTIVE PULMONARY DISEASE, UNSPECIFIED COPD TYPE (HCC): ICD-10-CM

## 2017-08-28 DIAGNOSIS — J44.9 CHRONIC OBSTRUCTIVE PULMONARY DISEASE, UNSPECIFIED COPD TYPE (HCC): ICD-10-CM

## 2017-08-28 NOTE — TELEPHONE ENCOUNTER
Have we ever prescribed this med? Yes.  If yes, what date? 4/26/17    Last OV: 8/4/17    Next OV: 11/2/17    DX: COPD    Medications: albuterol (PROAIR HFA) 108 (90 BASE) MCG/ACT Aero Soln inhalation aerosol

## 2017-10-31 ENCOUNTER — TELEPHONE (OUTPATIENT)
Dept: PULMONOLOGY | Facility: HOSPICE | Age: 57
End: 2017-10-31

## 2017-10-31 NOTE — TELEPHONE ENCOUNTER
Patient has a follow up visit with BERTIN Jacinto 11/2/17 for HST results. Results are not in the system, and not scheduled. Left message advised patient of OV and test results informed her to call back and inform us if study was done

## 2017-10-31 NOTE — TELEPHONE ENCOUNTER
Medicaid no longer accepts HSS as qualifying testing - pt will need a in lab dx sleep study if she still wishes to pursue PAP treatment

## 2017-11-02 ENCOUNTER — APPOINTMENT (OUTPATIENT)
Dept: PULMONOLOGY | Facility: HOSPICE | Age: 57
End: 2017-11-02
Payer: MEDICAID

## 2017-11-02 NOTE — TELEPHONE ENCOUNTER
Left message advised per medicaid they no longer cover HST and will need to come in and we can order an in lab ss. Informed her to call [phone]

## 2018-02-01 DIAGNOSIS — J44.9 CHRONIC OBSTRUCTIVE PULMONARY DISEASE, UNSPECIFIED COPD TYPE (HCC): ICD-10-CM

## 2018-02-01 RX ORDER — BUDESONIDE AND FORMOTEROL FUMARATE DIHYDRATE 160; 4.5 UG/1; UG/1
AEROSOL RESPIRATORY (INHALATION)
Qty: 1 INHALER | Refills: 2 | Status: SHIPPED | OUTPATIENT
Start: 2018-02-01 | End: 2018-03-19 | Stop reason: SDUPTHER

## 2018-02-02 NOTE — TELEPHONE ENCOUNTER
Have we ever prescribed this med? Yes.  If yes, what date? 08/28/2017    Last OV: 08/04/2017 - Christi Hinojosa    Next OV: none scheduled; was to follow up November 2017    DX: COPD    Medications: Proair

## 2018-02-02 NOTE — TELEPHONE ENCOUNTER
Have we ever prescribed this med? Yes.  If yes, what date?     Last OV: 08/04/2017 - Christi Hinojosa    Next OV: none scheduled; was to follow up November 2017    DX: COPD    Medications: Symbicort

## 2018-02-17 DIAGNOSIS — J44.9 CHRONIC OBSTRUCTIVE PULMONARY DISEASE, UNSPECIFIED COPD TYPE (HCC): ICD-10-CM

## 2018-02-20 RX ORDER — TIOTROPIUM BROMIDE 18 UG/1
CAPSULE ORAL; RESPIRATORY (INHALATION)
Qty: 30 CAP | Refills: 0 | Status: SHIPPED | OUTPATIENT
Start: 2018-02-20 | End: 2018-03-19 | Stop reason: SDUPTHER

## 2018-02-20 NOTE — TELEPHONE ENCOUNTER
Have we ever prescribed this med? Yes.  If yes, what date? 8/4/17    Last OV: 8/4/17 3Mth Fu    Next OV: No pending apt scheduled    DX: Chronic obstructive pulmonary disease, unspecified COPD type (CMS-HCC) (J44.9)    Medications: SPIRIVA HANDIHALER 18 MCG Cap

## 2018-02-20 NOTE — TELEPHONE ENCOUNTER
Have we ever prescribed this med? Yes.  If yes, what date? 2/1/18    Last OV: 8/4/17 3 Mth FU    Next OV: No pending apt scheduled    DX: Chronic obstructive pulmonary disease, unspecified COPD type (CMS-HCC) (J44.9)    Medications: PROAIR  (90 Base) MCG/ACT Aero Soln inhalation aerosol     Please Deny- Pt was told 2/1/18 they needs OV for further refills

## 2018-02-28 DIAGNOSIS — J44.9 CHRONIC OBSTRUCTIVE PULMONARY DISEASE, UNSPECIFIED COPD TYPE (HCC): ICD-10-CM

## 2018-02-28 NOTE — TELEPHONE ENCOUNTER
Have we ever prescribed this med? Yes.  If yes, what date? 2/1/18    Last OV: 8/4/17 3mth FU    Next OV: No pending apt schediuled    DX: COPD    Medications: PROAIR  (90 Base) MCG/ACT Aero Soln inhalation aerosol     Please deny. Pt was told 2/1/18 they would need ov for further refills

## 2018-03-19 DIAGNOSIS — R53.83 OTHER FATIGUE: ICD-10-CM

## 2018-03-19 DIAGNOSIS — J44.9 CHRONIC OBSTRUCTIVE PULMONARY DISEASE, UNSPECIFIED COPD TYPE (HCC): ICD-10-CM

## 2018-03-19 DIAGNOSIS — G47.33 OSA (OBSTRUCTIVE SLEEP APNEA): ICD-10-CM

## 2018-03-19 RX ORDER — TIOTROPIUM BROMIDE 18 UG/1
CAPSULE ORAL; RESPIRATORY (INHALATION)
Qty: 30 CAP | Refills: 1 | Status: SHIPPED | OUTPATIENT
Start: 2018-03-19 | End: 2018-04-25 | Stop reason: SDUPTHER

## 2018-03-19 RX ORDER — BUDESONIDE AND FORMOTEROL FUMARATE DIHYDRATE 160; 4.5 UG/1; UG/1
AEROSOL RESPIRATORY (INHALATION)
Qty: 1 INHALER | Refills: 1 | Status: SHIPPED | OUTPATIENT
Start: 2018-03-19 | End: 2018-05-23 | Stop reason: SDUPTHER

## 2018-03-19 NOTE — TELEPHONE ENCOUNTER
Patient has Medicaid as an insurance and they don't take HST. Test was not completed. Will need to complete different type of sleep study. Please order and I will call the patient and have scheduled. Looks like appt from 11/2/17 was cancelled due to HSt not being completed due to medicaid not covering test

## 2018-03-19 NOTE — TELEPHONE ENCOUNTER
Left message informing RX sent and per medicaid HST was not covered and needs to competed in lab study at the sleep center. encouraged she call [phone] to schedule both sleep study and follow up

## 2018-03-19 NOTE — TELEPHONE ENCOUNTER
CLAUDIA Goldstein.   You 3 minutes ago (10:27 AM)      Please check if patient has completed testing per last OV and needs f/u visit now for further fills (Routing comment)

## 2018-03-19 NOTE — TELEPHONE ENCOUNTER
CLAUDIA Goldstein.  Lory Tejeda, Med Ass't             Will medicaid cover an inlab test?      Answer is yes just not HST

## 2018-03-19 NOTE — TELEPHONE ENCOUNTER
Caller Name: Pal Whiteside                 Call Back Number: [phone] (home)         Patient approves a detailed voicemail message: N\A    Have we ever prescribed this med? Yes.  If yes, what date? Symbicort 2/1/18, Proair 4/26/17    Last OV: 8/4/17 BERTIN Claire     Next OV: NO PENDING APPT  PLAN:   Patient Instructions   1) Continue Symbicort 160, Spiriva, rescue inhaler   2) continue nocturnal oxygen at 2.5 L  3) Home sleep study   4) Medrol pack and zpack for exacerbation  4) Vaccines: Up to date with Prevnar 13 and Pneumovax 23  5) Return in about 3 months (around 11/4/2017), or or Adriana Perez, for Compliance, review of symptoms, if not sooner, follow up with BERTIN Feliz.   6) Smoking cessation encouraged     DX: COPD     Medications: Symbicort, Proair

## 2018-03-19 NOTE — TELEPHONE ENCOUNTER
CLAUDIA Goldstein.   You 1 hour ago (12:34 PM)      Have patient make f/u appt at sleep center for results. (Routing comment)

## 2018-03-19 NOTE — TELEPHONE ENCOUNTER
Caller Name: Pal Whiteside                 Call Back Number: [phone] (home)         Patient approves a detailed voicemail message: N\A    Have we ever prescribed this med? Yes.  If yes, what date? 2/20/18    Last OV: 8/4/17 BERTIN Claire    Next OV: NO PENDING APPT    PLAN:   Patient Instructions   1) Continue Symbicort 160, Spiriva, rescue inhaler   2) continue nocturnal oxygen at 2.5 L  3) Home sleep study   4) Medrol pack and zpack for exacerbation  4) Vaccines: Up to date with Prevnar 13 and Pneumovax 23  5) Return in about 3 months (around 11/4/2017), or or Adriana Perez, for Compliance, review of symptoms, if not sooner, follow up with BERTIN Feliz.   6) Smoking cessation encouraged     DX: COPD

## 2018-03-22 ENCOUNTER — TELEPHONE (OUTPATIENT)
Dept: PULMONOLOGY | Facility: HOSPICE | Age: 58
End: 2018-03-22

## 2018-03-22 NOTE — TELEPHONE ENCOUNTER
MEDICATION PRIOR AUTHORIZATION NEEDED:    1. Name of Medication: Proair  mcg    2. Requested By (Name of Pharmacy): [facility]     3. Is insurance on file current? yes    4. What is the name & phone number of the 3rd party payor? [employer] [phone]

## 2018-04-25 DIAGNOSIS — J44.9 CHRONIC OBSTRUCTIVE PULMONARY DISEASE, UNSPECIFIED COPD TYPE (HCC): ICD-10-CM

## 2018-05-01 RX ORDER — TIOTROPIUM BROMIDE 18 UG/1
CAPSULE ORAL; RESPIRATORY (INHALATION)
Qty: 30 CAP | Refills: 0 | Status: SHIPPED | OUTPATIENT
Start: 2018-05-01 | End: 2018-06-09 | Stop reason: SDUPTHER

## 2018-05-16 NOTE — TELEPHONE ENCOUNTER
DOCUMENTATION OF PRIOR AUTH STATUS    1. Medication name and dose: Proair  (90 base) mcg    2. Name and Phone # of Prescription coverage company: [employer] [phone]    3. Date Prior Auth was submitted: 5/2/2018    4. What information was given to obtain insurance decision: Clinical notes    5. Prior Auth letter Approved or Denied: Approved through 3/20/2019    6. Pharmacy notified: Yes    7. Patient notified: Yes

## 2018-06-09 DIAGNOSIS — J44.9 CHRONIC OBSTRUCTIVE PULMONARY DISEASE, UNSPECIFIED COPD TYPE (HCC): ICD-10-CM

## 2018-06-11 RX ORDER — TIOTROPIUM BROMIDE 18 UG/1
CAPSULE ORAL; RESPIRATORY (INHALATION)
Qty: 30 CAP | Refills: 5 | Status: SHIPPED | OUTPATIENT
Start: 2018-06-11

## 2018-06-11 RX ORDER — BUDESONIDE AND FORMOTEROL FUMARATE DIHYDRATE 160; 4.5 UG/1; UG/1
AEROSOL RESPIRATORY (INHALATION)
Qty: 1 INHALER | Refills: 1 | Status: SHIPPED | OUTPATIENT
Start: 2018-06-11

## 2018-06-11 NOTE — TELEPHONE ENCOUNTER
Caller Name: Pal Whiteside                 Call Back Number: [phone] (home)         Patient approves a detailed voicemail message: N\A    Have we ever prescribed this med? Yes.  If yes, what date? 5/23/18    Last OV: 8/4/17 BERTIN Elizalde     Next OV: NO pending appt      DX: COPD     Medications:  Current Outpatient Prescriptions   Medication Sig Dispense Refill   • SYMBICORT 160-4.5 MCG/ACT Aerosol INHALE 2 PUFFS BY MOUTH TWICE DAILY 1 Inhaler 2   • SPIRIVA HANDIHALER 18 MCG Cap INHALE CONTENTS OF 1 CAPSULE BY MOUTH VIA HANDIHALER ONCE DAILY 30 Cap 0   • PROAIR  (90 Base) MCG/ACT Aero Soln inhalation aerosol INHALE 2 PUFFS BY MOUTH EVERY 4 HOURS AS NEEDED FOR SHORTNESS OF BREATH 1 Inhaler 0   • gabapentin (NEURONTIN) 600 MG tablet   0   • ranitidine (ZANTAC) 150 MG Tab   0   • MethylPREDNISolone (MEDROL DOSEPAK) 4 MG Tablet Therapy Pack Take as directed. 21 Tab 0   • azithromycin (ZITHROMAX) 250 MG Tab Take 2 tablets on day 1, then take 1 tablet a day for 4 days. 6 Tab 0   • naproxen (NAPROSYN) 500 MG Tab   9   • albuterol 108 (90 BASE) MCG/ACT Aero Soln inhalation aerosol Inhale 2 Puffs by mouth every 6 hours as needed for Shortness of Breath.     • divalproex ER (DEPAKOTE ER) 500 MG TABLET SR 24 HR Take 500 mg by mouth every day.     • gabapentin (NEURONTIN) 300 MG Cap Take 1 Cap by mouth 3 times a day. 90 Cap 3   • doxepin (SINEQUAN) 50 MG CAPS Take 100 mg by mouth every evening.     • ranitidine (ZANTAC) 300 MG tablet Take 300 mg by mouth 2 Times a Day.     • multivitamin (THERAGRAN) TABS Take 1 Tab by mouth every day. Indications: **OTC**     • nicotine (NICODERM) 21 MG/24HR PT24 Apply 1 Patch to skin as directed every day. 30 Patch 0   • aripiprazole (ABILIFY) 5 MG tablet Take 5 mg by mouth every day.       No current facility-administered medications for this visit.

## 2018-06-11 NOTE — TELEPHONE ENCOUNTER
Caller Name: Pal Whiteside                 Call Back Number: [phone] (home)         Patient approves a detailed voicemail message: N\A    Have we ever prescribed this med? Yes.  If yes, what date? BOTH 5/1/18    Last OV: 8/4/17 BERTIN Elizalde     Next OV: NO PENDING APPT   PLAN:   Patient Instructions   1) Continue Symbicort 160, Spiriva, rescue inhaler   2) continue nocturnal oxygen at 2.5 L  3) Home sleep study   4) Medrol pack and zpack for exacerbation  4) Vaccines: Up to date with Prevnar 13 and Pneumovax 23  5) Return in about 3 months (around 11/4/2017), or or Adriana Perez, for Compliance, review of symptoms, if not sooner, follow up with BERTIN Feliz.   6) Smoking cessation encouraged         DX: COPD    Medications:  Current Outpatient Prescriptions   Medication Sig Dispense Refill   • SYMBICORT 160-4.5 MCG/ACT Aerosol INHALE 2 PUFFS BY MOUTH TWICE DAILY 1 Inhaler 2   • SPIRIVA HANDIHALER 18 MCG Cap INHALE CONTENTS OF 1 CAPSULE BY MOUTH VIA HANDIHALER ONCE DAILY 30 Cap 0   • PROAIR  (90 Base) MCG/ACT Aero Soln inhalation aerosol INHALE 2 PUFFS BY MOUTH EVERY 4 HOURS AS NEEDED FOR SHORTNESS OF BREATH 1 Inhaler 0   • gabapentin (NEURONTIN) 600 MG tablet   0   • ranitidine (ZANTAC) 150 MG Tab   0   • MethylPREDNISolone (MEDROL DOSEPAK) 4 MG Tablet Therapy Pack Take as directed. 21 Tab 0   • azithromycin (ZITHROMAX) 250 MG Tab Take 2 tablets on day 1, then take 1 tablet a day for 4 days. 6 Tab 0   • naproxen (NAPROSYN) 500 MG Tab   9   • albuterol 108 (90 BASE) MCG/ACT Aero Soln inhalation aerosol Inhale 2 Puffs by mouth every 6 hours as needed for Shortness of Breath.     • divalproex ER (DEPAKOTE ER) 500 MG TABLET SR 24 HR Take 500 mg by mouth every day.     • gabapentin (NEURONTIN) 300 MG Cap Take 1 Cap by mouth 3 times a day. 90 Cap 3   • doxepin (SINEQUAN) 50 MG CAPS Take 100 mg by mouth every evening.     • ranitidine (ZANTAC) 300 MG tablet Take 300 mg by mouth 2 Times a Day.     •  multivitamin (THERAGRAN) TABS Take 1 Tab by mouth every day. Indications: **OTC**     • nicotine (NICODERM) 21 MG/24HR PT24 Apply 1 Patch to skin as directed every day. 30 Patch 0   • aripiprazole (ABILIFY) 5 MG tablet Take 5 mg by mouth every day.       No current facility-administered medications for this visit.

## 2018-10-12 DIAGNOSIS — J44.9 CHRONIC OBSTRUCTIVE PULMONARY DISEASE, UNSPECIFIED COPD TYPE (HCC): ICD-10-CM

## 2018-10-12 RX ORDER — BUDESONIDE AND FORMOTEROL FUMARATE DIHYDRATE 160; 4.5 UG/1; UG/1
AEROSOL RESPIRATORY (INHALATION)
Qty: 0.1 INHALER | Refills: 0 | OUTPATIENT
Start: 2018-10-12

## 2018-10-12 NOTE — TELEPHONE ENCOUNTER
PLEASE DENY, PT NEEDS OV FOR FURTHER REFILLS, THERE HAVE BEEN MANY ATTEMPTS TO CONTACT PT TO SCHEDULE FOLLOW UP APT     Have we ever prescribed this med? Yes.  If yes, what date? 6/11/18    Last OV:  8/4/17 BERTIN Elizalde     Next OV: No pending apt scheduled     DX: Chronic obstructive pulmonary disease, unspecified COPD type (HCC) (J44.9)    Medications: SYMBICORT 160-4.5 MCG/ACT Aerosol

## 2018-11-09 DIAGNOSIS — J44.9 CHRONIC OBSTRUCTIVE PULMONARY DISEASE, UNSPECIFIED COPD TYPE (HCC): ICD-10-CM

## 2018-11-09 RX ORDER — BUDESONIDE AND FORMOTEROL FUMARATE DIHYDRATE 160; 4.5 UG/1; UG/1
AEROSOL RESPIRATORY (INHALATION)
Qty: 1 INHALER | Refills: 1 | Status: SHIPPED | OUTPATIENT
Start: 2018-11-09

## 2018-11-09 NOTE — TELEPHONE ENCOUNTER
Caller Name: Pal Whiteside                 Call Back Number: [phone] (home)         Patient approves a detailed voicemail message: N\A    Have we ever prescribed this med? Yes.  If yes, what date? 6/11/18    Last OV: 8/4/17 LY Hinojosa. APRN      PLAN:   Patient Instructions   1) Continue Symbicort 160, Spiriva, rescue inhaler   2) continue nocturnal oxygen at 2.5 L  3) Home sleep study   4) Medrol pack and zpack for exacerbation  4) Vaccines: Up to date with Prevnar 13 and Pneumovax 23  5) Return in about 3 months (around 11/4/2017), or or Adriana Perez, for Compliance, review of symptoms, if not sooner, follow up with Christi Hinojosa, BERTIN.   6) Smoking cessation encouraged          Next OV: NO PENDING APPT     DX: Chronic obstructive pulmonary disease, unspecified COPD type ([facility])     Medications:  Current Outpatient Prescriptions   Medication Sig Dispense Refill   • SPIRIVA HANDIHALER 18 MCG Cap INHALE CONTENTS OF 1 CAPSULE VIA HANDIHALER ONCE DAILY 30 Cap 5   • PROAIR  (90 Base) MCG/ACT Aero Soln inhalation aerosol INHALE 2 PUFFS BY MOUTH EVERY 4 HOURS AS NEEDED FOR SHORTNESS OF BREATH 1 Inhaler 5   • SYMBICORT 160-4.5 MCG/ACT Aerosol INHALE 2 PUFFS BY MOUTH TWICE DAILY 1 Inhaler 1   • SYMBICORT 160-4.5 MCG/ACT Aerosol INHALE 2 PUFFS BY MOUTH TWICE DAILY 1 Inhaler 2   • gabapentin (NEURONTIN) 600 MG tablet   0   • ranitidine (ZANTAC) 150 MG Tab   0   • MethylPREDNISolone (MEDROL DOSEPAK) 4 MG Tablet Therapy Pack Take as directed. 21 Tab 0   • azithromycin (ZITHROMAX) 250 MG Tab Take 2 tablets on day 1, then take 1 tablet a day for 4 days. 6 Tab 0   • naproxen (NAPROSYN) 500 MG Tab   9   • albuterol 108 (90 BASE) MCG/ACT Aero Soln inhalation aerosol Inhale 2 Puffs by mouth every 6 hours as needed for Shortness of Breath.     • divalproex ER (DEPAKOTE ER) 500 MG TABLET SR 24 HR Take 500 mg by mouth every day.     • gabapentin (NEURONTIN) 300 MG Cap Take 1 Cap by mouth 3 times a day. 90 Cap 3   •  doxepin (SINEQUAN) 50 MG CAPS Take 100 mg by mouth every evening.     • ranitidine (ZANTAC) 300 MG tablet Take 300 mg by mouth 2 Times a Day.     • multivitamin (THERAGRAN) TABS Take 1 Tab by mouth every day. Indications: **OTC**     • nicotine (NICODERM) 21 MG/24HR PT24 Apply 1 Patch to skin as directed every day. 30 Patch 0   • aripiprazole (ABILIFY) 5 MG tablet Take 5 mg by mouth every day.       No current facility-administered medications for this visit.

## 2020-08-30 ENCOUNTER — HOSPITAL ENCOUNTER (INPATIENT)
Dept: HOSPITAL 8 - ED | Age: 60
LOS: 10 days | DRG: 710 | End: 2020-09-09
Attending: INTERNAL MEDICINE | Admitting: HOSPITALIST
Payer: MEDICAID

## 2020-08-30 VITALS — HEIGHT: 63.5 IN | BODY MASS INDEX: 33.91 KG/M2 | WEIGHT: 193.79 LBS

## 2020-08-30 DIAGNOSIS — M87.9: ICD-10-CM

## 2020-08-30 DIAGNOSIS — B95.4: ICD-10-CM

## 2020-08-30 DIAGNOSIS — R65.21: ICD-10-CM

## 2020-08-30 DIAGNOSIS — A41.9: Primary | ICD-10-CM

## 2020-08-30 DIAGNOSIS — J44.1: ICD-10-CM

## 2020-08-30 DIAGNOSIS — A48.3: ICD-10-CM

## 2020-08-30 DIAGNOSIS — G93.41: ICD-10-CM

## 2020-08-30 DIAGNOSIS — E43: ICD-10-CM

## 2020-08-30 DIAGNOSIS — S61.201A: ICD-10-CM

## 2020-08-30 DIAGNOSIS — D69.6: ICD-10-CM

## 2020-08-30 DIAGNOSIS — J96.01: ICD-10-CM

## 2020-08-30 DIAGNOSIS — M79.89: ICD-10-CM

## 2020-08-30 DIAGNOSIS — B95.0: ICD-10-CM

## 2020-08-30 DIAGNOSIS — N39.0: ICD-10-CM

## 2020-08-30 DIAGNOSIS — Z79.899: ICD-10-CM

## 2020-08-30 DIAGNOSIS — I48.91: ICD-10-CM

## 2020-08-30 DIAGNOSIS — J44.0: ICD-10-CM

## 2020-08-30 DIAGNOSIS — D63.8: ICD-10-CM

## 2020-08-30 DIAGNOSIS — J18.9: ICD-10-CM

## 2020-08-30 DIAGNOSIS — Z99.2: ICD-10-CM

## 2020-08-30 DIAGNOSIS — E83.51: ICD-10-CM

## 2020-08-30 DIAGNOSIS — L02.511: ICD-10-CM

## 2020-08-30 DIAGNOSIS — B96.20: ICD-10-CM

## 2020-08-30 DIAGNOSIS — L03.115: ICD-10-CM

## 2020-08-30 DIAGNOSIS — M60.9: ICD-10-CM

## 2020-08-30 DIAGNOSIS — E87.2: ICD-10-CM

## 2020-08-30 DIAGNOSIS — Z99.11: ICD-10-CM

## 2020-08-30 DIAGNOSIS — M19.90: ICD-10-CM

## 2020-08-30 DIAGNOSIS — Z51.5: ICD-10-CM

## 2020-08-30 DIAGNOSIS — I73.9: ICD-10-CM

## 2020-08-30 DIAGNOSIS — Z66: ICD-10-CM

## 2020-08-30 DIAGNOSIS — M72.6: ICD-10-CM

## 2020-08-30 DIAGNOSIS — M25.461: ICD-10-CM

## 2020-08-30 DIAGNOSIS — N17.0: ICD-10-CM

## 2020-08-30 DIAGNOSIS — Z88.0: ICD-10-CM

## 2020-08-30 PROCEDURE — 82803 BLOOD GASES ANY COMBINATION: CPT

## 2020-08-30 PROCEDURE — 96374 THER/PROPH/DIAG INJ IV PUSH: CPT

## 2020-08-30 PROCEDURE — 83550 IRON BINDING TEST: CPT

## 2020-08-30 PROCEDURE — 82570 ASSAY OF URINE CREATININE: CPT

## 2020-08-30 PROCEDURE — 93970 EXTREMITY STUDY: CPT

## 2020-08-30 PROCEDURE — 99291 CRITICAL CARE FIRST HOUR: CPT

## 2020-08-30 PROCEDURE — 36600 WITHDRAWAL OF ARTERIAL BLOOD: CPT

## 2020-08-30 PROCEDURE — 84300 ASSAY OF URINE SODIUM: CPT

## 2020-08-30 PROCEDURE — 85014 HEMATOCRIT: CPT

## 2020-08-30 PROCEDURE — 85025 COMPLETE CBC W/AUTO DIFF WBC: CPT

## 2020-08-30 PROCEDURE — 87205 SMEAR GRAM STAIN: CPT

## 2020-08-30 PROCEDURE — 85730 THROMBOPLASTIN TIME PARTIAL: CPT

## 2020-08-30 PROCEDURE — 82728 ASSAY OF FERRITIN: CPT

## 2020-08-30 PROCEDURE — 83540 ASSAY OF IRON: CPT

## 2020-08-30 PROCEDURE — 87147 CULTURE TYPE IMMUNOLOGIC: CPT

## 2020-08-30 PROCEDURE — 87081 CULTURE SCREEN ONLY: CPT

## 2020-08-30 PROCEDURE — 81001 URINALYSIS AUTO W/SCOPE: CPT

## 2020-08-30 PROCEDURE — 71045 X-RAY EXAM CHEST 1 VIEW: CPT

## 2020-08-30 PROCEDURE — 84156 ASSAY OF PROTEIN URINE: CPT

## 2020-08-30 PROCEDURE — 80048 BASIC METABOLIC PNL TOTAL CA: CPT

## 2020-08-30 PROCEDURE — 83935 ASSAY OF URINE OSMOLALITY: CPT

## 2020-08-30 PROCEDURE — 87086 URINE CULTURE/COLONY COUNT: CPT

## 2020-08-30 PROCEDURE — 77001 FLUOROGUIDE FOR VEIN DEVICE: CPT

## 2020-08-30 PROCEDURE — 86706 HEP B SURFACE ANTIBODY: CPT

## 2020-08-30 PROCEDURE — 84295 ASSAY OF SERUM SODIUM: CPT

## 2020-08-30 PROCEDURE — 84550 ASSAY OF BLOOD/URIC ACID: CPT

## 2020-08-30 PROCEDURE — 84100 ASSAY OF PHOSPHORUS: CPT

## 2020-08-30 PROCEDURE — 86140 C-REACTIVE PROTEIN: CPT

## 2020-08-30 PROCEDURE — 87070 CULTURE OTHR SPECIMN AEROBIC: CPT

## 2020-08-30 PROCEDURE — 84133 ASSAY OF URINE POTASSIUM: CPT

## 2020-08-30 PROCEDURE — P9047 ALBUMIN (HUMAN), 25%, 50ML: HCPCS

## 2020-08-30 PROCEDURE — 74018 RADEX ABDOMEN 1 VIEW: CPT

## 2020-08-30 PROCEDURE — 87040 BLOOD CULTURE FOR BACTERIA: CPT

## 2020-08-30 PROCEDURE — C1751 CATH, INF, PER/CENT/MIDLINE: HCPCS

## 2020-08-30 PROCEDURE — 84132 ASSAY OF SERUM POTASSIUM: CPT

## 2020-08-30 PROCEDURE — 84145 PROCALCITONIN (PCT): CPT

## 2020-08-30 PROCEDURE — 90935 HEMODIALYSIS ONE EVALUATION: CPT

## 2020-08-30 PROCEDURE — 82533 TOTAL CORTISOL: CPT

## 2020-08-30 PROCEDURE — 85384 FIBRINOGEN ACTIVITY: CPT

## 2020-08-30 PROCEDURE — 85610 PROTHROMBIN TIME: CPT

## 2020-08-30 PROCEDURE — 87806 HIV AG W/HIV1&2 ANTB W/OPTIC: CPT

## 2020-08-30 PROCEDURE — 80053 COMPREHEN METABOLIC PANEL: CPT

## 2020-08-30 PROCEDURE — 94002 VENT MGMT INPAT INIT DAY: CPT

## 2020-08-30 PROCEDURE — 86900 BLOOD TYPING SEROLOGIC ABO: CPT

## 2020-08-30 PROCEDURE — 80074 ACUTE HEPATITIS PANEL: CPT

## 2020-08-30 PROCEDURE — 87075 CULTR BACTERIA EXCEPT BLOOD: CPT

## 2020-08-30 PROCEDURE — 76937 US GUIDE VASCULAR ACCESS: CPT

## 2020-08-30 PROCEDURE — 82436 ASSAY OF URINE CHLORIDE: CPT

## 2020-08-30 PROCEDURE — 85379 FIBRIN DEGRADATION QUANT: CPT

## 2020-08-30 PROCEDURE — 83605 ASSAY OF LACTIC ACID: CPT

## 2020-08-30 PROCEDURE — 82330 ASSAY OF CALCIUM: CPT

## 2020-08-30 PROCEDURE — 80307 DRUG TEST PRSMV CHEM ANLYZR: CPT

## 2020-08-30 PROCEDURE — 87340 HEPATITIS B SURFACE AG IA: CPT

## 2020-08-30 PROCEDURE — 76770 US EXAM ABDO BACK WALL COMP: CPT

## 2020-08-30 PROCEDURE — 36415 COLL VENOUS BLD VENIPUNCTURE: CPT

## 2020-08-30 PROCEDURE — 87077 CULTURE AEROBIC IDENTIFY: CPT

## 2020-08-30 PROCEDURE — 82550 ASSAY OF CK (CPK): CPT

## 2020-08-30 PROCEDURE — 94003 VENT MGMT INPAT SUBQ DAY: CPT

## 2020-08-30 PROCEDURE — 83880 ASSAY OF NATRIURETIC PEPTIDE: CPT

## 2020-08-30 PROCEDURE — 36556 INSERT NON-TUNNEL CV CATH: CPT

## 2020-08-30 PROCEDURE — 93922 UPR/L XTREMITY ART 2 LEVELS: CPT

## 2020-08-30 PROCEDURE — 83735 ASSAY OF MAGNESIUM: CPT

## 2020-08-30 PROCEDURE — 82306 VITAMIN D 25 HYDROXY: CPT

## 2020-08-30 PROCEDURE — G0475 HIV COMBINATION ASSAY: HCPCS

## 2020-08-30 PROCEDURE — 80069 RENAL FUNCTION PANEL: CPT

## 2020-08-30 PROCEDURE — 87635 SARS-COV-2 COVID-19 AMP PRB: CPT

## 2020-08-30 PROCEDURE — 93005 ELECTROCARDIOGRAM TRACING: CPT

## 2020-08-30 PROCEDURE — 87186 SC STD MICRODIL/AGAR DIL: CPT

## 2020-08-30 PROCEDURE — 86704 HEP B CORE ANTIBODY TOTAL: CPT

## 2020-08-30 PROCEDURE — 84478 ASSAY OF TRIGLYCERIDES: CPT

## 2020-08-30 PROCEDURE — 93306 TTE W/DOPPLER COMPLETE: CPT

## 2020-08-30 PROCEDURE — 82947 ASSAY GLUCOSE BLOOD QUANT: CPT

## 2020-08-30 PROCEDURE — 80202 ASSAY OF VANCOMYCIN: CPT

## 2020-08-30 PROCEDURE — 87181 SC STD AGAR DILUTION PER AGT: CPT

## 2020-08-30 PROCEDURE — 86850 RBC ANTIBODY SCREEN: CPT

## 2020-08-30 PROCEDURE — 86592 SYPHILIS TEST NON-TREP QUAL: CPT

## 2020-08-30 PROCEDURE — 83036 HEMOGLOBIN GLYCOSYLATED A1C: CPT

## 2020-08-31 LAB
<PLATELET ESTIMATE>: ADEQUATE
<PLATELET ESTIMATE>: ADEQUATE
<PLT MORPHOLOGY>: (no result)
<PLT MORPHOLOGY>: (no result)
ALBUMIN SERPL-MCNC: 1.7 G/DL (ref 3.4–5)
ALP SERPL-CCNC: 91 U/L (ref 45–117)
ALT SERPL-CCNC: 24 U/L (ref 12–78)
ANION GAP SERPL CALC-SCNC: 10 MMOL/L (ref 5–15)
ANION GAP SERPL CALC-SCNC: 11 MMOL/L (ref 5–15)
ANISOCYTOSIS BLD QL SMEAR: (no result)
APTT BLD: 38 SECONDS (ref 25–31)
BAND#(MANUAL): 3.27 X10^3/UL
BAND#(MANUAL): 9.69 X10^3/UL
BILIRUB DIRECT SERPL-MCNC: NORMAL MG/DL
BILIRUB SERPL-MCNC: 1.1 MG/DL (ref 0.2–1)
CALCIUM SERPL-MCNC: 7.4 MG/DL (ref 8.5–10.1)
CALCIUM SERPL-MCNC: 7.6 MG/DL (ref 8.5–10.1)
CHLORIDE SERPL-SCNC: 105 MMOL/L (ref 98–107)
CHLORIDE SERPL-SCNC: 108 MMOL/L (ref 98–107)
CHLORIDE,URINE RANDOM: 21 MMOL/L
CREAT SERPL-MCNC: 4.08 MG/DL (ref 0.55–1.02)
CREAT SERPL-MCNC: 4.73 MG/DL (ref 0.55–1.02)
CRP SERPL-MCNC: > 19 MG/DL (ref 0.02–0.49)
D DIMER PPP FEU-MCNC: 4.27 UG/MLFEU (ref 0–0.52)
ERYTHROCYTE [DISTWIDTH] IN BLOOD BY AUTOMATED COUNT: 15.7 % (ref 9.6–15.2)
ERYTHROCYTE [DISTWIDTH] IN BLOOD BY AUTOMATED COUNT: 15.9 % (ref 9.6–15.2)
EST. AVERAGE GLUCOSE BLD GHB EST-MCNC: 114 MG/DL (ref 0–126)
FIBRINOGEN PPP-MCNC: 532 MG/DL (ref 200–340)
HBV CORE IGM SERPL QL IA: 219 MG/DL (ref 0–12)
HBV SURFACE AB SER RIA-ACNC: 150 MG/DL
INR PPP: 1.23 (ref 0.93–1.1)
LYMPH#(MANUAL): 0.59 X10^3/UL (ref 1–3.4)
LYMPHS% (MANUAL): 2 % (ref 22–44)
MCH RBC QN AUTO: 29.4 PG (ref 27–34.8)
MCH RBC QN AUTO: 29.7 PG (ref 27–34.8)
MCHC RBC AUTO-ENTMCNC: 32.8 G/DL (ref 32.4–35.8)
MCHC RBC AUTO-ENTMCNC: 33.2 G/DL (ref 32.4–35.8)
MCV RBC AUTO: 89.4 FL (ref 80–100)
MCV RBC AUTO: 89.8 FL (ref 80–100)
MD: YES
MD: YES
METAMYELOCYTES# (MANUAL): 3.11 X10^3/UL (ref 0–0)
METAMYELOCYTES% (MANUAL): 9 % (ref 0–1)
MICROSCOPIC: (no result)
MONOS#(MANUAL): 0.59 X10^3/UL (ref 0.3–2.7)
MONOS#(MANUAL): 0.69 X10^3/UL (ref 0.3–2.7)
MONOS% (MANUAL): 2 % (ref 2–9)
MONOS% (MANUAL): 2 % (ref 2–9)
MYELOCYTES# (MANUAL): 0.35 X10^3/UL (ref 0–0)
MYELOCYTES% (MANUAL): 1 % (ref 0–0)
NEUTS BAND NFR BLD: 11 % (ref 0–7)
NEUTS BAND NFR BLD: 28 % (ref 0–7)
O2 FLOW: 3 L/MIN
O2/TOTAL GAS SETTING VFR VENT: 100 %
O2/TOTAL GAS SETTING VFR VENT: 50 %
OSMOLALITY,URINE: 378 MOSM/KG (ref 500–850)
PH BLDV: 7.31 PH (ref 7.32–7.42)
PLATELET # BLD AUTO: 189 X10^3/UL (ref 130–400)
PLATELET # BLD AUTO: 196 X10^3/UL (ref 130–400)
PMNS WITH VACUOLES: (no result)
PMNS WITH VACUOLES: (no result)
PMV BLD AUTO: 9 FL (ref 7.4–10.4)
PMV BLD AUTO: 9.2 FL (ref 7.4–10.4)
POTASSIUM UR-SCNC: 54 MMOL/L
PROT SERPL-MCNC: 5.1 G/DL (ref 6.4–8.2)
PROTHROMBIN TIME: 12.7 SECONDS (ref 9.6–11.5)
RBC # BLD AUTO: 3.33 X10^6/UL (ref 3.82–5.3)
RBC # BLD AUTO: 3.77 X10^6/UL (ref 3.82–5.3)
SEG#(MANUAL): 20.76 X10^3/UL (ref 1.8–6.8)
SEG#(MANUAL): 25.25 X10^3/UL (ref 1.8–6.8)
SEGS% (MANUAL): 60 % (ref 42–75)
SEGS% (MANUAL): 85 % (ref 42–75)
SODIUM,URINE RANDOM: 43 MMOL/L
TOXIC GRAN: (no result)
TOXIC GRAN: (no result)

## 2020-08-31 PROCEDURE — 02HV33Z INSERTION OF INFUSION DEVICE INTO SUPERIOR VENA CAVA, PERCUTANEOUS APPROACH: ICD-10-PCS | Performed by: EMERGENCY MEDICINE

## 2020-08-31 PROCEDURE — 0J9K0ZZ DRAINAGE OF LEFT HAND SUBCUTANEOUS TISSUE AND FASCIA, OPEN APPROACH: ICD-10-PCS | Performed by: ORTHOPAEDIC SURGERY

## 2020-08-31 PROCEDURE — 5A1955Z RESPIRATORY VENTILATION, GREATER THAN 96 CONSECUTIVE HOURS: ICD-10-PCS | Performed by: INTERNAL MEDICINE

## 2020-08-31 PROCEDURE — 0JBJ0ZZ EXCISION OF RIGHT HAND SUBCUTANEOUS TISSUE AND FASCIA, OPEN APPROACH: ICD-10-PCS | Performed by: ORTHOPAEDIC SURGERY

## 2020-08-31 PROCEDURE — B548ZZA ULTRASONOGRAPHY OF SUPERIOR VENA CAVA, GUIDANCE: ICD-10-PCS | Performed by: EMERGENCY MEDICINE

## 2020-08-31 PROCEDURE — 0JBK0ZZ EXCISION OF LEFT HAND SUBCUTANEOUS TISSUE AND FASCIA, OPEN APPROACH: ICD-10-PCS | Performed by: ORTHOPAEDIC SURGERY

## 2020-08-31 PROCEDURE — 0T9B30Z DRAINAGE OF BLADDER WITH DRAINAGE DEVICE, PERCUTANEOUS APPROACH: ICD-10-PCS | Performed by: ORTHOPAEDIC SURGERY

## 2020-08-31 PROCEDURE — 0BH17EZ INSERTION OF ENDOTRACHEAL AIRWAY INTO TRACHEA, VIA NATURAL OR ARTIFICIAL OPENING: ICD-10-PCS | Performed by: RADIOLOGY

## 2020-08-31 PROCEDURE — 0J9J0ZZ DRAINAGE OF RIGHT HAND SUBCUTANEOUS TISSUE AND FASCIA, OPEN APPROACH: ICD-10-PCS | Performed by: ORTHOPAEDIC SURGERY

## 2020-08-31 RX ADMIN — MEROPENEM SCH MLS/HR: 500 INJECTION, POWDER, FOR SOLUTION INTRAVENOUS at 08:51

## 2020-08-31 RX ADMIN — NOREPINEPHRINE BITARTRATE PRN MLS/HR: 1 INJECTION INTRAVENOUS at 15:57

## 2020-08-31 RX ADMIN — AMIODARONE HYDROCHLORIDE PRN MLS/HR: 50 INJECTION, SOLUTION INTRAVENOUS at 12:56

## 2020-08-31 RX ADMIN — FAMOTIDINE SCH MG: 10 INJECTION INTRAVENOUS at 09:42

## 2020-08-31 RX ADMIN — MIDAZOLAM HYDROCHLORIDE PRN MLS/HR: 5 INJECTION, SOLUTION INTRAMUSCULAR; INTRAVENOUS at 23:22

## 2020-08-31 RX ADMIN — AMIODARONE HYDROCHLORIDE PRN MLS/HR: 50 INJECTION, SOLUTION INTRAVENOUS at 21:12

## 2020-08-31 RX ADMIN — CLINDAMYCIN IN 5 PERCENT DEXTROSE SCH MLS/HR: 18 INJECTION, SOLUTION INTRAVENOUS at 05:04

## 2020-08-31 RX ADMIN — Medication PRN EACH: at 12:59

## 2020-08-31 RX ADMIN — HEPARIN SODIUM SCH UNITS: 5000 INJECTION, SOLUTION INTRAVENOUS; SUBCUTANEOUS at 18:59

## 2020-08-31 RX ADMIN — MIDAZOLAM HYDROCHLORIDE PRN MLS/HR: 5 INJECTION, SOLUTION INTRAMUSCULAR; INTRAVENOUS at 10:23

## 2020-08-31 RX ADMIN — SODIUM CHLORIDE, SODIUM LACTATE, POTASSIUM CHLORIDE, AND CALCIUM CHLORIDE SCH MLS/HR: .6; .31; .03; .02 INJECTION, SOLUTION INTRAVENOUS at 15:57

## 2020-08-31 RX ADMIN — ACETAMINOPHEN PRN MG: 160 SOLUTION ORAL at 18:59

## 2020-08-31 RX ADMIN — FENTANYL CITRATE PRN MCG: 50 INJECTION INTRAMUSCULAR; INTRAVENOUS at 10:33

## 2020-08-31 RX ADMIN — Medication PRN EACH: at 21:13

## 2020-08-31 RX ADMIN — MEROPENEM SCH MLS/HR: 500 INJECTION, POWDER, FOR SOLUTION INTRAVENOUS at 21:33

## 2020-08-31 RX ADMIN — CLINDAMYCIN IN 5 PERCENT DEXTROSE SCH MLS/HR: 18 INJECTION, SOLUTION INTRAVENOUS at 13:09

## 2020-08-31 RX ADMIN — FAMOTIDINE SCH MG: 10 INJECTION INTRAVENOUS at 21:33

## 2020-08-31 RX ADMIN — ACETAMINOPHEN PRN MG: 160 SOLUTION ORAL at 13:09

## 2020-08-31 RX ADMIN — SODIUM CHLORIDE, SODIUM LACTATE, POTASSIUM CHLORIDE, AND CALCIUM CHLORIDE SCH MLS/HR: .6; .31; .03; .02 INJECTION, SOLUTION INTRAVENOUS at 05:08

## 2020-08-31 RX ADMIN — HEPARIN SODIUM SCH UNITS: 5000 INJECTION, SOLUTION INTRAVENOUS; SUBCUTANEOUS at 09:47

## 2020-08-31 RX ADMIN — NOREPINEPHRINE BITARTRATE PRN MLS/HR: 1 INJECTION INTRAVENOUS at 08:51

## 2020-08-31 RX ADMIN — CLINDAMYCIN IN 5 PERCENT DEXTROSE SCH MLS/HR: 18 INJECTION, SOLUTION INTRAVENOUS at 21:33

## 2020-08-31 NOTE — NUR
BROUGHT IN BY [facility] FROM [facility], PER  PATIENT WAS BIT BY SOMETHING 
LAST WEEK. PATIENT FELL DUE TO WEAKNESS BUT WAS LAYED DOWN BY . DIDNT 
HIT HEAD. RIGHT HAND SWELLING AND RIGHT AND LEFT MIDDLE FINGER NECROSIS NOTED. 
HYPOTENSIVE UPON ARRIVAL TO ED.

## 2020-09-01 VITALS — DIASTOLIC BLOOD PRESSURE: 54 MMHG | SYSTOLIC BLOOD PRESSURE: 107 MMHG

## 2020-09-01 LAB
<PLATELET ESTIMATE>: ADEQUATE
<PLT MORPHOLOGY>: (no result)
ALBUMIN SERPL-MCNC: 1.3 G/DL (ref 3.4–5)
ALP SERPL-CCNC: 112 U/L (ref 45–117)
ALT SERPL-CCNC: 42 U/L (ref 12–78)
ANION GAP SERPL CALC-SCNC: 10 MMOL/L (ref 5–15)
ANISOCYTOSIS BLD QL SMEAR: (no result)
BAND#(MANUAL): 9.76 X10^3/UL
BILIRUB SERPL-MCNC: 1.4 MG/DL (ref 0.2–1)
CALCIUM SERPL-MCNC: 7.8 MG/DL (ref 8.5–10.1)
CHLORIDE SERPL-SCNC: 107 MMOL/L (ref 98–107)
CREAT SERPL-MCNC: 2.94 MG/DL (ref 0.55–1.02)
DOHLE BOD BLD QL SMEAR: (no result)
ERYTHROCYTE [DISTWIDTH] IN BLOOD BY AUTOMATED COUNT: 15.4 % (ref 9.6–15.2)
LYMPH#(MANUAL): 0.81 X10^3/UL (ref 1–3.4)
LYMPHS% (MANUAL): 3 % (ref 22–44)
MCH RBC QN AUTO: 29.5 PG (ref 27–34.8)
MCHC RBC AUTO-ENTMCNC: 33.9 G/DL (ref 32.4–35.8)
MCV RBC AUTO: 87.2 FL (ref 80–100)
MD: YES
METAMYELOCYTES# (MANUAL): 1.36 X10^3/UL (ref 0–0)
METAMYELOCYTES% (MANUAL): 5 % (ref 0–1)
MONOS#(MANUAL): 1.63 X10^3/UL (ref 0.3–2.7)
MONOS% (MANUAL): 6 % (ref 2–9)
MYELOCYTES# (MANUAL): 0.27 X10^3/UL (ref 0–0)
MYELOCYTES% (MANUAL): 1 % (ref 0–0)
NEUTS BAND NFR BLD: 36 % (ref 0–7)
O2/TOTAL GAS SETTING VFR VENT: 40 %
PLATELET # BLD AUTO: 142 X10^3/UL (ref 130–400)
PMNS WITH VACUOLES: (no result)
PMV BLD AUTO: 9 FL (ref 7.4–10.4)
PROT SERPL-MCNC: 4.6 G/DL (ref 6.4–8.2)
RBC # BLD AUTO: 3.76 X10^6/UL (ref 3.82–5.3)
SEG#(MANUAL): 13.28 X10^3/UL (ref 1.8–6.8)
SEGS% (MANUAL): 49 % (ref 42–75)
TOXIC GRAN: (no result)

## 2020-09-01 RX ADMIN — SODIUM CHLORIDE, SODIUM LACTATE, POTASSIUM CHLORIDE, AND CALCIUM CHLORIDE SCH MLS/HR: .6; .31; .03; .02 INJECTION, SOLUTION INTRAVENOUS at 02:22

## 2020-09-01 RX ADMIN — NOREPINEPHRINE BITARTRATE PRN MLS/HR: 1 INJECTION INTRAVENOUS at 00:56

## 2020-09-01 RX ADMIN — SODIUM CHLORIDE, SODIUM LACTATE, POTASSIUM CHLORIDE, AND CALCIUM CHLORIDE SCH MLS/HR: .6; .31; .03; .02 INJECTION, SOLUTION INTRAVENOUS at 11:55

## 2020-09-01 RX ADMIN — CLINDAMYCIN IN 5 PERCENT DEXTROSE SCH MLS/HR: 18 INJECTION, SOLUTION INTRAVENOUS at 04:54

## 2020-09-01 RX ADMIN — CLINDAMYCIN IN 5 PERCENT DEXTROSE SCH MLS/HR: 18 INJECTION, SOLUTION INTRAVENOUS at 13:20

## 2020-09-01 RX ADMIN — MIDAZOLAM HYDROCHLORIDE PRN MLS/HR: 5 INJECTION, SOLUTION INTRAMUSCULAR; INTRAVENOUS at 21:13

## 2020-09-01 RX ADMIN — FENTANYL CITRATE PRN MCG: 50 INJECTION INTRAMUSCULAR; INTRAVENOUS at 23:28

## 2020-09-01 RX ADMIN — CLINDAMYCIN IN 5 PERCENT DEXTROSE SCH MLS/HR: 18 INJECTION, SOLUTION INTRAVENOUS at 23:28

## 2020-09-01 RX ADMIN — HEPARIN SODIUM SCH UNITS: 5000 INJECTION, SOLUTION INTRAVENOUS; SUBCUTANEOUS at 02:22

## 2020-09-01 RX ADMIN — MEROPENEM SCH MLS/HR: 500 INJECTION, POWDER, FOR SOLUTION INTRAVENOUS at 23:28

## 2020-09-01 RX ADMIN — MEROPENEM SCH MLS/HR: 500 INJECTION, POWDER, FOR SOLUTION INTRAVENOUS at 08:32

## 2020-09-01 RX ADMIN — HEPARIN SODIUM SCH UNITS: 5000 INJECTION, SOLUTION INTRAVENOUS; SUBCUTANEOUS at 17:37

## 2020-09-01 RX ADMIN — AMIODARONE HYDROCHLORIDE PRN MLS/HR: 50 INJECTION, SOLUTION INTRAVENOUS at 12:00

## 2020-09-01 RX ADMIN — FAMOTIDINE SCH MG: 10 INJECTION INTRAVENOUS at 23:27

## 2020-09-01 RX ADMIN — NOREPINEPHRINE BITARTRATE PRN MLS/HR: 1 INJECTION INTRAVENOUS at 11:49

## 2020-09-01 RX ADMIN — ACETAMINOPHEN PRN MG: 160 SOLUTION ORAL at 16:22

## 2020-09-01 RX ADMIN — HEPARIN SODIUM SCH UNITS: 5000 INJECTION, SOLUTION INTRAVENOUS; SUBCUTANEOUS at 10:47

## 2020-09-02 VITALS — DIASTOLIC BLOOD PRESSURE: 70 MMHG | SYSTOLIC BLOOD PRESSURE: 118 MMHG

## 2020-09-02 LAB
% IRON SATURATION: 9 % (ref 20–55)
<PLATELET ESTIMATE>: (no result)
<PLT MORPHOLOGY>: (no result)
ALBUMIN SERPL-MCNC: 1.2 G/DL (ref 3.4–5)
ALP SERPL-CCNC: 119 U/L (ref 45–117)
ALT SERPL-CCNC: 36 U/L (ref 12–78)
ANION GAP SERPL CALC-SCNC: 6 MMOL/L (ref 5–15)
ANISOCYTOSIS BLD QL SMEAR: (no result)
BAND#(MANUAL): 5.08 X10^3/UL
BILIRUB SERPL-MCNC: 1.3 MG/DL (ref 0.2–1)
CALCIUM SERPL-MCNC: 7.6 MG/DL (ref 8.5–10.1)
CHLORIDE SERPL-SCNC: 107 MMOL/L (ref 98–107)
CREAT SERPL-MCNC: 1.99 MG/DL (ref 0.55–1.02)
DOHLE BOD BLD QL SMEAR: (no result)
EOS#(MANUAL): 0.25 X10^3/UL (ref 0–0.4)
EOS% (MANUAL): 1 % (ref 1–7)
ERYTHROCYTE [DISTWIDTH] IN BLOOD BY AUTOMATED COUNT: 15.3 % (ref 9.6–15.2)
IRON LEVEL: 15 MCG/DL (ref 50–170)
LYMPH#(MANUAL): 1.52 X10^3/UL (ref 1–3.4)
LYMPHS% (MANUAL): 6 % (ref 22–44)
MCH RBC QN AUTO: 29.3 PG (ref 27–34.8)
MCHC RBC AUTO-ENTMCNC: 33.6 G/DL (ref 32.4–35.8)
MCV RBC AUTO: 87 FL (ref 80–100)
MD: YES
METAMYELOCYTES# (MANUAL): 0.25 X10^3/UL (ref 0–0)
METAMYELOCYTES% (MANUAL): 1 % (ref 0–1)
MONOS#(MANUAL): 0.51 X10^3/UL (ref 0.3–2.7)
MONOS% (MANUAL): 2 % (ref 2–9)
NEUTS BAND NFR BLD: 20 % (ref 0–7)
O2/TOTAL GAS SETTING VFR VENT: 40 %
PLATELET # BLD AUTO: 79 X10^3/UL (ref 130–400)
PMNS WITH VACUOLES: (no result)
PMV BLD AUTO: 9.1 FL (ref 7.4–10.4)
PROT SERPL-MCNC: 4.6 G/DL (ref 6.4–8.2)
RBC # BLD AUTO: 3.63 X10^6/UL (ref 3.82–5.3)
SEG#(MANUAL): 17.78 X10^3/UL (ref 1.8–6.8)
SEGS% (MANUAL): 70 % (ref 42–75)
TIBC SERPL-MCNC: 163 MCG/DL (ref 250–450)
TOXIC GRAN: (no result)

## 2020-09-02 RX ADMIN — HEPARIN SODIUM SCH UNITS: 5000 INJECTION, SOLUTION INTRAVENOUS; SUBCUTANEOUS at 10:16

## 2020-09-02 RX ADMIN — FENTANYL CITRATE PRN MCG: 50 INJECTION INTRAMUSCULAR; INTRAVENOUS at 18:14

## 2020-09-02 RX ADMIN — CLINDAMYCIN IN 5 PERCENT DEXTROSE SCH MLS/HR: 18 INJECTION, SOLUTION INTRAVENOUS at 12:52

## 2020-09-02 RX ADMIN — AMIODARONE HYDROCHLORIDE PRN MLS/HR: 50 INJECTION, SOLUTION INTRAVENOUS at 01:51

## 2020-09-02 RX ADMIN — HEPARIN SODIUM SCH UNITS: 5000 INJECTION, SOLUTION INTRAVENOUS; SUBCUTANEOUS at 18:13

## 2020-09-02 RX ADMIN — FENTANYL CITRATE PRN MCG: 50 INJECTION INTRAMUSCULAR; INTRAVENOUS at 23:02

## 2020-09-02 RX ADMIN — HEPARIN SODIUM SCH UNITS: 5000 INJECTION, SOLUTION INTRAVENOUS; SUBCUTANEOUS at 01:36

## 2020-09-02 RX ADMIN — FENTANYL CITRATE PRN MCG: 50 INJECTION INTRAMUSCULAR; INTRAVENOUS at 12:52

## 2020-09-02 RX ADMIN — FENTANYL CITRATE PRN MCG: 50 INJECTION INTRAMUSCULAR; INTRAVENOUS at 01:32

## 2020-09-02 RX ADMIN — FENTANYL CITRATE PRN MCG: 50 INJECTION INTRAMUSCULAR; INTRAVENOUS at 05:00

## 2020-09-02 RX ADMIN — FENTANYL CITRATE PRN MCG: 50 INJECTION INTRAMUSCULAR; INTRAVENOUS at 09:57

## 2020-09-02 RX ADMIN — FAMOTIDINE SCH MG: 10 INJECTION INTRAVENOUS at 21:15

## 2020-09-02 RX ADMIN — FENTANYL CITRATE PRN MCG: 50 INJECTION INTRAMUSCULAR; INTRAVENOUS at 17:17

## 2020-09-02 RX ADMIN — SODIUM CHLORIDE, SODIUM LACTATE, POTASSIUM CHLORIDE, AND CALCIUM CHLORIDE SCH MLS/HR: .6; .31; .03; .02 INJECTION, SOLUTION INTRAVENOUS at 02:21

## 2020-09-02 RX ADMIN — FENTANYL CITRATE PRN MCG: 50 INJECTION INTRAMUSCULAR; INTRAVENOUS at 14:17

## 2020-09-02 RX ADMIN — NOREPINEPHRINE BITARTRATE PRN MLS/HR: 1 INJECTION INTRAVENOUS at 01:31

## 2020-09-02 RX ADMIN — MEROPENEM SCH MLS/HR: 500 INJECTION, POWDER, FOR SOLUTION INTRAVENOUS at 09:57

## 2020-09-02 RX ADMIN — CLINDAMYCIN IN 5 PERCENT DEXTROSE SCH MLS/HR: 18 INJECTION, SOLUTION INTRAVENOUS at 05:00

## 2020-09-02 RX ADMIN — AMIODARONE HYDROCHLORIDE PRN MLS/HR: 50 INJECTION, SOLUTION INTRAVENOUS at 19:49

## 2020-09-02 RX ADMIN — CLINDAMYCIN IN 5 PERCENT DEXTROSE SCH MLS/HR: 18 INJECTION, SOLUTION INTRAVENOUS at 21:15

## 2020-09-03 VITALS — DIASTOLIC BLOOD PRESSURE: 53 MMHG | SYSTOLIC BLOOD PRESSURE: 99 MMHG

## 2020-09-03 LAB
<PLATELET ESTIMATE>: (no result)
<PLT MORPHOLOGY>: (no result)
ALBUMIN SERPL-MCNC: 1.1 G/DL (ref 3.4–5)
ANION GAP SERPL CALC-SCNC: 7 MMOL/L (ref 5–15)
ANISOCYTOSIS BLD QL SMEAR: (no result)
BAND#(MANUAL): 1.05 X10^3/UL
CALCIUM SERPL-MCNC: 7.7 MG/DL (ref 8.5–10.1)
CHLORIDE SERPL-SCNC: 107 MMOL/L (ref 98–107)
CREAT SERPL-MCNC: 1.59 MG/DL (ref 0.55–1.02)
ERYTHROCYTE [DISTWIDTH] IN BLOOD BY AUTOMATED COUNT: 16.2 % (ref 9.6–15.2)
LYMPH#(MANUAL): 0.88 X10^3/UL (ref 1–3.4)
LYMPHS% (MANUAL): 5 % (ref 22–44)
MCH RBC QN AUTO: 28.7 PG (ref 27–34.8)
MCHC RBC AUTO-ENTMCNC: 32.6 G/DL (ref 32.4–35.8)
MCV RBC AUTO: 88 FL (ref 80–100)
MD: YES
METAMYELOCYTES# (MANUAL): 0.18 X10^3/UL (ref 0–0)
METAMYELOCYTES% (MANUAL): 1 % (ref 0–1)
MONOS#(MANUAL): 0.7 X10^3/UL (ref 0.3–2.7)
MONOS% (MANUAL): 4 % (ref 2–9)
NEUTS BAND NFR BLD: 6 % (ref 0–7)
O2/TOTAL GAS SETTING VFR VENT: 40 %
PLATELET # BLD AUTO: 62 X10^3/UL (ref 130–400)
PMV BLD AUTO: 9 FL (ref 7.4–10.4)
RBC # BLD AUTO: 3.33 X10^6/UL (ref 3.82–5.3)
SEG#(MANUAL): 14.7 X10^3/UL (ref 1.8–6.8)
SEGS% (MANUAL): 84 % (ref 42–75)
TOXIC GRAN: (no result)
TRIGL SERPL-MCNC: 157 MG/DL (ref 50–200)

## 2020-09-03 RX ADMIN — FENTANYL CITRATE PRN MCG: 50 INJECTION INTRAMUSCULAR; INTRAVENOUS at 02:17

## 2020-09-03 RX ADMIN — CLINDAMYCIN IN 5 PERCENT DEXTROSE SCH MLS/HR: 18 INJECTION, SOLUTION INTRAVENOUS at 20:43

## 2020-09-03 RX ADMIN — ALBUMIN (HUMAN) PRN MLS/HR: 25 SOLUTION INTRAVENOUS at 10:13

## 2020-09-03 RX ADMIN — ALBUMIN (HUMAN) PRN MLS/HR: 25 SOLUTION INTRAVENOUS at 12:10

## 2020-09-03 RX ADMIN — FENTANYL CITRATE PRN MCG: 50 INJECTION INTRAMUSCULAR; INTRAVENOUS at 12:39

## 2020-09-03 RX ADMIN — FENTANYL CITRATE PRN MCG: 50 INJECTION INTRAMUSCULAR; INTRAVENOUS at 09:12

## 2020-09-03 RX ADMIN — ACETAMINOPHEN PRN MG: 160 SOLUTION ORAL at 16:02

## 2020-09-03 RX ADMIN — MIDAZOLAM HYDROCHLORIDE PRN MLS/HR: 5 INJECTION, SOLUTION INTRAMUSCULAR; INTRAVENOUS at 02:19

## 2020-09-03 RX ADMIN — FAMOTIDINE SCH MG: 10 INJECTION INTRAVENOUS at 20:42

## 2020-09-03 RX ADMIN — FENTANYL CITRATE PRN MCG: 50 INJECTION INTRAMUSCULAR; INTRAVENOUS at 20:43

## 2020-09-03 RX ADMIN — AMIODARONE HYDROCHLORIDE PRN MLS/HR: 50 INJECTION, SOLUTION INTRAVENOUS at 23:32

## 2020-09-03 RX ADMIN — CLINDAMYCIN IN 5 PERCENT DEXTROSE SCH MLS/HR: 18 INJECTION, SOLUTION INTRAVENOUS at 05:29

## 2020-09-03 RX ADMIN — FENTANYL CITRATE PRN MCG: 50 INJECTION INTRAMUSCULAR; INTRAVENOUS at 23:30

## 2020-09-03 RX ADMIN — MEROPENEM SCH MLS/HR: 1 INJECTION, POWDER, FOR SOLUTION INTRAVENOUS at 16:00

## 2020-09-03 RX ADMIN — CLINDAMYCIN IN 5 PERCENT DEXTROSE SCH MLS/HR: 18 INJECTION, SOLUTION INTRAVENOUS at 13:00

## 2020-09-03 RX ADMIN — FENTANYL CITRATE PRN MCG: 50 INJECTION INTRAMUSCULAR; INTRAVENOUS at 05:28

## 2020-09-03 RX ADMIN — AMIODARONE HYDROCHLORIDE PRN MLS/HR: 50 INJECTION, SOLUTION INTRAVENOUS at 08:42

## 2020-09-03 RX ADMIN — IRON SUCROSE ONE MG: 20 INJECTION, SOLUTION INTRAVENOUS at 12:54

## 2020-09-03 RX ADMIN — MIDAZOLAM HYDROCHLORIDE PRN MLS/HR: 5 INJECTION, SOLUTION INTRAMUSCULAR; INTRAVENOUS at 16:23

## 2020-09-03 RX ADMIN — HEPARIN SODIUM SCH UNITS: 5000 INJECTION, SOLUTION INTRAVENOUS; SUBCUTANEOUS at 02:16

## 2020-09-03 RX ADMIN — IRON SUCROSE ONE MG: 20 INJECTION, SOLUTION INTRAVENOUS at 12:00

## 2020-09-03 RX ADMIN — ALBUMIN (HUMAN) PRN MLS/HR: 25 SOLUTION INTRAVENOUS at 11:14

## 2020-09-03 RX ADMIN — HEPARIN SODIUM SCH UNITS: 5000 INJECTION, SOLUTION INTRAVENOUS; SUBCUTANEOUS at 20:42

## 2020-09-03 RX ADMIN — NOREPINEPHRINE BITARTRATE PRN MLS/HR: 1 INJECTION INTRAVENOUS at 08:41

## 2020-09-03 RX ADMIN — HEPARIN SODIUM SCH UNITS: 5000 INJECTION, SOLUTION INTRAVENOUS; SUBCUTANEOUS at 12:52

## 2020-09-04 VITALS — DIASTOLIC BLOOD PRESSURE: 51 MMHG | SYSTOLIC BLOOD PRESSURE: 91 MMHG

## 2020-09-04 LAB
<PLATELET ESTIMATE>: (no result)
<PLT MORPHOLOGY>: (no result)
ANION GAP SERPL CALC-SCNC: 6 MMOL/L (ref 5–15)
ANISOCYTOSIS BLD QL SMEAR: (no result)
BAND#(MANUAL): 0.94 X10^3/UL
CALCIUM SERPL-MCNC: 7.5 MG/DL (ref 8.5–10.1)
CHLORIDE SERPL-SCNC: 105 MMOL/L (ref 98–107)
CREAT SERPL-MCNC: 1.5 MG/DL (ref 0.55–1.02)
ERYTHROCYTE [DISTWIDTH] IN BLOOD BY AUTOMATED COUNT: 15.7 % (ref 9.6–15.2)
LYMPH#(MANUAL): 0.59 X10^3/UL (ref 1–3.4)
LYMPHS% (MANUAL): 5 % (ref 22–44)
MCH RBC QN AUTO: 28.8 PG (ref 27–34.8)
MCHC RBC AUTO-ENTMCNC: 33.1 G/DL (ref 32.4–35.8)
MCV RBC AUTO: 87.1 FL (ref 80–100)
MD: YES
MONOS#(MANUAL): 0.35 X10^3/UL (ref 0.3–2.7)
MONOS% (MANUAL): 3 % (ref 2–9)
NEUTS BAND NFR BLD: 8 % (ref 0–7)
O2/TOTAL GAS SETTING VFR VENT: 40 %
PLATELET # BLD AUTO: 66 X10^3/UL (ref 130–400)
PMNS WITH VACUOLES: (no result)
PMV BLD AUTO: 9.1 FL (ref 7.4–10.4)
RBC # BLD AUTO: 2.8 X10^6/UL (ref 3.82–5.3)
SEG#(MANUAL): 9.91 X10^3/UL (ref 1.8–6.8)
SEGS% (MANUAL): 84 % (ref 42–75)
TOXIC GRAN: (no result)

## 2020-09-04 RX ADMIN — MIDAZOLAM HYDROCHLORIDE PRN MLS/HR: 5 INJECTION, SOLUTION INTRAMUSCULAR; INTRAVENOUS at 02:58

## 2020-09-04 RX ADMIN — AMIODARONE HYDROCHLORIDE PRN MLS/HR: 50 INJECTION, SOLUTION INTRAVENOUS at 12:25

## 2020-09-04 RX ADMIN — POTASSIUM CHLORIDE SCH MEQ: 40 SOLUTION ORAL at 14:32

## 2020-09-04 RX ADMIN — CLINDAMYCIN IN 5 PERCENT DEXTROSE SCH MLS/HR: 18 INJECTION, SOLUTION INTRAVENOUS at 14:33

## 2020-09-04 RX ADMIN — CLINDAMYCIN IN 5 PERCENT DEXTROSE SCH MLS/HR: 18 INJECTION, SOLUTION INTRAVENOUS at 22:06

## 2020-09-04 RX ADMIN — FAMOTIDINE SCH MG: 10 INJECTION INTRAVENOUS at 20:40

## 2020-09-04 RX ADMIN — FENTANYL CITRATE PRN MLS/HR: 50 INJECTION INTRAVENOUS at 02:58

## 2020-09-04 RX ADMIN — FENTANYL CITRATE PRN MLS/HR: 50 INJECTION INTRAVENOUS at 12:21

## 2020-09-04 RX ADMIN — NOREPINEPHRINE BITARTRATE PRN MLS/HR: 1 INJECTION INTRAVENOUS at 12:26

## 2020-09-04 RX ADMIN — CLINDAMYCIN IN 5 PERCENT DEXTROSE SCH MLS/HR: 18 INJECTION, SOLUTION INTRAVENOUS at 04:49

## 2020-09-04 RX ADMIN — MEROPENEM SCH MLS/HR: 1 INJECTION, POWDER, FOR SOLUTION INTRAVENOUS at 20:39

## 2020-09-04 RX ADMIN — HEPARIN SODIUM SCH UNITS: 5000 INJECTION, SOLUTION INTRAVENOUS; SUBCUTANEOUS at 04:49

## 2020-09-04 RX ADMIN — HEPARIN SODIUM SCH UNITS: 5000 INJECTION, SOLUTION INTRAVENOUS; SUBCUTANEOUS at 14:35

## 2020-09-04 RX ADMIN — HEPARIN SODIUM SCH UNITS: 5000 INJECTION, SOLUTION INTRAVENOUS; SUBCUTANEOUS at 20:39

## 2020-09-04 RX ADMIN — POTASSIUM CHLORIDE SCH MEQ: 40 SOLUTION ORAL at 20:39

## 2020-09-04 RX ADMIN — MIDAZOLAM HYDROCHLORIDE PRN MLS/HR: 5 INJECTION, SOLUTION INTRAMUSCULAR; INTRAVENOUS at 20:49

## 2020-09-05 VITALS — DIASTOLIC BLOOD PRESSURE: 55 MMHG | SYSTOLIC BLOOD PRESSURE: 97 MMHG

## 2020-09-05 LAB
<PLATELET ESTIMATE>: (no result)
<PLT MORPHOLOGY>: (no result)
ALBUMIN SERPL-MCNC: 1.5 G/DL (ref 3.4–5)
ALP SERPL-CCNC: 302 U/L (ref 45–117)
ALT SERPL-CCNC: 94 U/L (ref 12–78)
ANION GAP SERPL CALC-SCNC: 6 MMOL/L (ref 5–15)
ANISOCYTOSIS BLD QL SMEAR: (no result)
BAND#(MANUAL): 0.15 X10^3/UL
BILIRUB SERPL-MCNC: 2 MG/DL (ref 0.2–1)
CALCIUM SERPL-MCNC: 8.1 MG/DL (ref 8.5–10.1)
CHLORIDE SERPL-SCNC: 102 MMOL/L (ref 98–107)
CREAT SERPL-MCNC: 1.85 MG/DL (ref 0.55–1.02)
EOS#(MANUAL): 0.3 X10^3/UL (ref 0–0.4)
EOS% (MANUAL): 2 % (ref 1–7)
ERYTHROCYTE [DISTWIDTH] IN BLOOD BY AUTOMATED COUNT: 15.9 % (ref 9.6–15.2)
LYMPH#(MANUAL): 0.59 X10^3/UL (ref 1–3.4)
LYMPHS% (MANUAL): 4 % (ref 22–44)
MCH RBC QN AUTO: 28.9 PG (ref 27–34.8)
MCHC RBC AUTO-ENTMCNC: 33.1 G/DL (ref 32.4–35.8)
MCV RBC AUTO: 87.4 FL (ref 80–100)
MD: YES
METAMYELOCYTES# (MANUAL): 0.15 X10^3/UL (ref 0–0)
METAMYELOCYTES% (MANUAL): 1 % (ref 0–1)
MONOS#(MANUAL): 0.59 X10^3/UL (ref 0.3–2.7)
MONOS% (MANUAL): 4 % (ref 2–9)
NEUTS BAND NFR BLD: 1 % (ref 0–7)
O2/TOTAL GAS SETTING VFR VENT: 40 %
PLATELET # BLD AUTO: 108 X10^3/UL (ref 130–400)
PMV BLD AUTO: 9 FL (ref 7.4–10.4)
PROT SERPL-MCNC: 5.2 G/DL (ref 6.4–8.2)
RBC # BLD AUTO: 3.19 X10^6/UL (ref 3.82–5.3)
SEG#(MANUAL): 13.02 X10^3/UL (ref 1.8–6.8)
SEGS% (MANUAL): 88 % (ref 42–75)
TOXIC GRAN: (no result)
VANCOMYCIN,RANDOM: 16 MCG/ML

## 2020-09-05 RX ADMIN — ALBUMIN (HUMAN) PRN MLS/HR: 25 SOLUTION INTRAVENOUS at 21:29

## 2020-09-05 RX ADMIN — Medication PRN EACH: at 09:51

## 2020-09-05 RX ADMIN — MIDAZOLAM HYDROCHLORIDE PRN MLS/HR: 5 INJECTION, SOLUTION INTRAMUSCULAR; INTRAVENOUS at 10:21

## 2020-09-05 RX ADMIN — AMIODARONE HYDROCHLORIDE PRN MLS/HR: 50 INJECTION, SOLUTION INTRAVENOUS at 09:51

## 2020-09-05 RX ADMIN — FENTANYL CITRATE PRN MLS/HR: 50 INJECTION INTRAVENOUS at 10:21

## 2020-09-05 RX ADMIN — FAMOTIDINE SCH MG: 10 INJECTION INTRAVENOUS at 19:59

## 2020-09-05 RX ADMIN — ALBUMIN (HUMAN) PRN MLS/HR: 25 SOLUTION INTRAVENOUS at 19:42

## 2020-09-05 RX ADMIN — HEPARIN SODIUM SCH UNITS: 5000 INJECTION, SOLUTION INTRAVENOUS; SUBCUTANEOUS at 11:34

## 2020-09-05 RX ADMIN — ALBUMIN (HUMAN) PRN MLS/HR: 25 SOLUTION INTRAVENOUS at 22:47

## 2020-09-05 RX ADMIN — HEPARIN SODIUM SCH UNITS: 5000 INJECTION, SOLUTION INTRAVENOUS; SUBCUTANEOUS at 04:34

## 2020-09-05 RX ADMIN — CEFTAROLINE FOSAMIL SCH MLS/HR: 600 POWDER, FOR SOLUTION INTRAVENOUS at 11:17

## 2020-09-05 RX ADMIN — CLINDAMYCIN IN 5 PERCENT DEXTROSE SCH MLS/HR: 18 INJECTION, SOLUTION INTRAVENOUS at 05:47

## 2020-09-05 RX ADMIN — HEPARIN SODIUM SCH UNITS: 5000 INJECTION, SOLUTION INTRAVENOUS; SUBCUTANEOUS at 19:59

## 2020-09-05 RX ADMIN — FENTANYL CITRATE PRN MLS/HR: 50 INJECTION INTRAVENOUS at 01:24

## 2020-09-05 RX ADMIN — FENTANYL CITRATE PRN MLS/HR: 50 INJECTION INTRAVENOUS at 17:55

## 2020-09-06 VITALS — SYSTOLIC BLOOD PRESSURE: 152 MMHG | DIASTOLIC BLOOD PRESSURE: 65 MMHG

## 2020-09-06 LAB
ALBUMIN SERPL-MCNC: 2.6 G/DL (ref 3.4–5)
ALP SERPL-CCNC: 277 U/L (ref 45–117)
ALT SERPL-CCNC: 96 U/L (ref 12–78)
ANION GAP SERPL CALC-SCNC: 4 MMOL/L (ref 5–15)
BASOPHILS # BLD AUTO: 0.01 X10^3/UL (ref 0–0.1)
BASOPHILS NFR BLD AUTO: 0 % (ref 0–1)
BILIRUB SERPL-MCNC: 2.3 MG/DL (ref 0.2–1)
CALCIUM SERPL-MCNC: 8.3 MG/DL (ref 8.5–10.1)
CHLORIDE SERPL-SCNC: 106 MMOL/L (ref 98–107)
CREAT SERPL-MCNC: 1.05 MG/DL (ref 0.55–1.02)
EOSINOPHIL # BLD AUTO: 0.12 X10^3/UL (ref 0–0.4)
EOSINOPHIL NFR BLD AUTO: 1 % (ref 1–7)
ERYTHROCYTE [DISTWIDTH] IN BLOOD BY AUTOMATED COUNT: 15.8 % (ref 9.6–15.2)
LYMPHOCYTES # BLD AUTO: 1.02 X10^3/UL (ref 1–3.4)
LYMPHOCYTES NFR BLD AUTO: 8 % (ref 22–44)
MCH RBC QN AUTO: 29 PG (ref 27–34.8)
MCHC RBC AUTO-ENTMCNC: 33.4 G/DL (ref 32.4–35.8)
MCV RBC AUTO: 86.8 FL (ref 80–100)
MD: (no result)
MONOCYTES # BLD AUTO: 0.94 X10^3/UL (ref 0.2–0.8)
MONOCYTES NFR BLD AUTO: 7 % (ref 2–9)
NEUTROPHILS # BLD AUTO: 10.67 X10^3/UL (ref 1.8–6.8)
NEUTROPHILS NFR BLD AUTO: 84 % (ref 42–75)
O2/TOTAL GAS SETTING VFR VENT: 40 %
PLATELET # BLD AUTO: 138 X10^3/UL (ref 130–400)
PMV BLD AUTO: 8.6 FL (ref 7.4–10.4)
PROT SERPL-MCNC: 5.8 G/DL (ref 6.4–8.2)
RBC # BLD AUTO: 3.02 X10^6/UL (ref 3.82–5.3)
TRIGL SERPL-MCNC: 205 MG/DL (ref 50–200)

## 2020-09-06 RX ADMIN — AMIODARONE HYDROCHLORIDE PRN MLS/HR: 50 INJECTION, SOLUTION INTRAVENOUS at 02:45

## 2020-09-06 RX ADMIN — FENTANYL CITRATE PRN MLS/HR: 50 INJECTION INTRAVENOUS at 04:47

## 2020-09-06 RX ADMIN — HEPARIN SODIUM SCH UNITS: 5000 INJECTION, SOLUTION INTRAVENOUS; SUBCUTANEOUS at 20:02

## 2020-09-06 RX ADMIN — FAMOTIDINE SCH MG: 10 INJECTION INTRAVENOUS at 20:02

## 2020-09-06 RX ADMIN — HEPARIN SODIUM SCH UNITS: 5000 INJECTION, SOLUTION INTRAVENOUS; SUBCUTANEOUS at 12:10

## 2020-09-06 RX ADMIN — FENTANYL CITRATE PRN MLS/HR: 50 INJECTION INTRAVENOUS at 10:55

## 2020-09-06 RX ADMIN — FENTANYL CITRATE PRN MLS/HR: 50 INJECTION INTRAVENOUS at 17:50

## 2020-09-06 RX ADMIN — MIDAZOLAM HYDROCHLORIDE PRN MLS/HR: 5 INJECTION, SOLUTION INTRAMUSCULAR; INTRAVENOUS at 04:59

## 2020-09-06 RX ADMIN — CEFTAROLINE FOSAMIL SCH MLS/HR: 600 POWDER, FOR SOLUTION INTRAVENOUS at 09:37

## 2020-09-06 RX ADMIN — Medication PRN EACH: at 02:44

## 2020-09-06 RX ADMIN — CEFTAROLINE FOSAMIL SCH MLS/HR: 600 POWDER, FOR SOLUTION INTRAVENOUS at 16:55

## 2020-09-06 RX ADMIN — ACETAMINOPHEN PRN MG: 160 SOLUTION ORAL at 16:30

## 2020-09-06 RX ADMIN — MIDAZOLAM HYDROCHLORIDE PRN MLS/HR: 5 INJECTION, SOLUTION INTRAMUSCULAR; INTRAVENOUS at 10:49

## 2020-09-06 RX ADMIN — AMIODARONE HYDROCHLORIDE PRN MLS/HR: 50 INJECTION, SOLUTION INTRAVENOUS at 00:10

## 2020-09-06 RX ADMIN — AMIODARONE HYDROCHLORIDE SCH MG: 200 TABLET ORAL at 20:02

## 2020-09-06 RX ADMIN — CEFTAROLINE FOSAMIL SCH MLS/HR: 600 POWDER, FOR SOLUTION INTRAVENOUS at 00:09

## 2020-09-06 RX ADMIN — HEPARIN SODIUM SCH UNITS: 5000 INJECTION, SOLUTION INTRAVENOUS; SUBCUTANEOUS at 04:40

## 2020-09-07 VITALS — DIASTOLIC BLOOD PRESSURE: 49 MMHG | SYSTOLIC BLOOD PRESSURE: 79 MMHG

## 2020-09-07 LAB
<PLATELET ESTIMATE>: ADEQUATE
<PLT MORPHOLOGY>: (no result)
ALBUMIN SERPL-MCNC: 1.9 G/DL (ref 3.4–5)
ANION GAP SERPL CALC-SCNC: 4 MMOL/L (ref 5–15)
ANISOCYTOSIS BLD QL SMEAR: (no result)
BAND#(MANUAL): 0.12 X10^3/UL
CALCIUM SERPL-MCNC: 8.3 MG/DL (ref 8.5–10.1)
CHLORIDE SERPL-SCNC: 106 MMOL/L (ref 98–107)
CREAT SERPL-MCNC: 1.34 MG/DL (ref 0.55–1.02)
EOS#(MANUAL): 0.25 X10^3/UL (ref 0–0.4)
EOS% (MANUAL): 2 % (ref 1–7)
ERYTHROCYTE [DISTWIDTH] IN BLOOD BY AUTOMATED COUNT: 15.6 % (ref 9.6–15.2)
LYMPH#(MANUAL): 0.74 X10^3/UL (ref 1–3.4)
LYMPHS% (MANUAL): 6 % (ref 22–44)
MCH RBC QN AUTO: 28.2 PG (ref 27–34.8)
MCHC RBC AUTO-ENTMCNC: 32.1 G/DL (ref 32.4–35.8)
MCV RBC AUTO: 88 FL (ref 80–100)
MD: YES
METAMYELOCYTES# (MANUAL): 0.25 X10^3/UL (ref 0–0)
METAMYELOCYTES% (MANUAL): 2 % (ref 0–1)
MONOS#(MANUAL): 0.62 X10^3/UL (ref 0.3–2.7)
MONOS% (MANUAL): 5 % (ref 2–9)
NEUTS BAND NFR BLD: 1 % (ref 0–7)
O2/TOTAL GAS SETTING VFR VENT: 40 %
PLATELET # BLD AUTO: 199 X10^3/UL (ref 130–400)
PMV BLD AUTO: 8.5 FL (ref 7.4–10.4)
RBC # BLD AUTO: 2.84 X10^6/UL (ref 3.82–5.3)
SEG#(MANUAL): 10.42 X10^3/UL (ref 1.8–6.8)
SEGS% (MANUAL): 84 % (ref 42–75)

## 2020-09-07 RX ADMIN — NOREPINEPHRINE BITARTRATE PRN MLS/HR: 1 INJECTION INTRAVENOUS at 20:51

## 2020-09-07 RX ADMIN — MIDAZOLAM HYDROCHLORIDE PRN MLS/HR: 5 INJECTION, SOLUTION INTRAMUSCULAR; INTRAVENOUS at 03:18

## 2020-09-07 RX ADMIN — FENTANYL CITRATE PRN MLS/HR: 50 INJECTION INTRAVENOUS at 20:06

## 2020-09-07 RX ADMIN — FAMOTIDINE SCH MG: 10 INJECTION INTRAVENOUS at 20:50

## 2020-09-07 RX ADMIN — FENTANYL CITRATE PRN MLS/HR: 50 INJECTION INTRAVENOUS at 14:03

## 2020-09-07 RX ADMIN — HEPARIN SODIUM SCH UNITS: 5000 INJECTION, SOLUTION INTRAVENOUS; SUBCUTANEOUS at 11:53

## 2020-09-07 RX ADMIN — FENTANYL CITRATE PRN MLS/HR: 50 INJECTION INTRAVENOUS at 03:19

## 2020-09-07 RX ADMIN — MIDAZOLAM HYDROCHLORIDE PRN MLS/HR: 5 INJECTION, SOLUTION INTRAMUSCULAR; INTRAVENOUS at 14:20

## 2020-09-07 RX ADMIN — AMIODARONE HYDROCHLORIDE SCH MG: 200 TABLET ORAL at 08:24

## 2020-09-07 RX ADMIN — AMIODARONE HYDROCHLORIDE SCH MG: 200 TABLET ORAL at 20:50

## 2020-09-07 RX ADMIN — HEPARIN SODIUM SCH UNITS: 5000 INJECTION, SOLUTION INTRAVENOUS; SUBCUTANEOUS at 03:19

## 2020-09-07 RX ADMIN — CEFTAROLINE FOSAMIL SCH MLS/HR: 600 POWDER, FOR SOLUTION INTRAVENOUS at 09:46

## 2020-09-07 RX ADMIN — HEPARIN SODIUM SCH UNITS: 5000 INJECTION, SOLUTION INTRAVENOUS; SUBCUTANEOUS at 20:07

## 2020-09-07 RX ADMIN — CEFTAROLINE FOSAMIL SCH MLS/HR: 600 POWDER, FOR SOLUTION INTRAVENOUS at 01:41

## 2020-09-07 RX ADMIN — CEFTAROLINE FOSAMIL SCH MLS/HR: 600 POWDER, FOR SOLUTION INTRAVENOUS at 17:42

## 2020-09-08 VITALS — DIASTOLIC BLOOD PRESSURE: 62 MMHG | SYSTOLIC BLOOD PRESSURE: 122 MMHG

## 2020-09-08 LAB
ANION GAP SERPL CALC-SCNC: 5 MMOL/L (ref 5–15)
BASOPHILS # BLD AUTO: 0.01 X10^3/UL (ref 0–0.1)
BASOPHILS NFR BLD AUTO: 0 % (ref 0–1)
CALCIUM SERPL-MCNC: 8.6 MG/DL (ref 8.5–10.1)
CHLORIDE SERPL-SCNC: 109 MMOL/L (ref 98–107)
CREAT SERPL-MCNC: 1.35 MG/DL (ref 0.55–1.02)
EOSINOPHIL # BLD AUTO: 0.3 X10^3/UL (ref 0–0.4)
EOSINOPHIL NFR BLD AUTO: 2 % (ref 1–7)
ERYTHROCYTE [DISTWIDTH] IN BLOOD BY AUTOMATED COUNT: 15.4 % (ref 9.6–15.2)
LYMPHOCYTES # BLD AUTO: 1.17 X10^3/UL (ref 1–3.4)
LYMPHOCYTES NFR BLD AUTO: 8 % (ref 22–44)
MCH RBC QN AUTO: 29.1 PG (ref 27–34.8)
MCHC RBC AUTO-ENTMCNC: 33.3 G/DL (ref 32.4–35.8)
MCV RBC AUTO: 87.6 FL (ref 80–100)
MD: (no result)
MONOCYTES # BLD AUTO: 1.62 X10^3/UL (ref 0.2–0.8)
MONOCYTES NFR BLD AUTO: 11 % (ref 2–9)
NEUTROPHILS # BLD AUTO: 11.59 X10^3/UL (ref 1.8–6.8)
NEUTROPHILS NFR BLD AUTO: 79 % (ref 42–75)
O2/TOTAL GAS SETTING VFR VENT: 40 %
PLATELET # BLD AUTO: 290 X10^3/UL (ref 130–400)
PMV BLD AUTO: 9.1 FL (ref 7.4–10.4)
RBC # BLD AUTO: 2.66 X10^6/UL (ref 3.82–5.3)

## 2020-09-08 RX ADMIN — CEFTAROLINE FOSAMIL SCH MLS/HR: 600 POWDER, FOR SOLUTION INTRAVENOUS at 01:38

## 2020-09-08 RX ADMIN — AMIODARONE HYDROCHLORIDE SCH MG: 200 TABLET ORAL at 09:09

## 2020-09-08 RX ADMIN — HEPARIN SODIUM SCH UNITS: 5000 INJECTION, SOLUTION INTRAVENOUS; SUBCUTANEOUS at 13:05

## 2020-09-08 RX ADMIN — HEPARIN SODIUM SCH UNITS: 5000 INJECTION, SOLUTION INTRAVENOUS; SUBCUTANEOUS at 03:46

## 2020-09-08 RX ADMIN — CEFTAROLINE FOSAMIL SCH MLS/HR: 600 POWDER, FOR SOLUTION INTRAVENOUS at 09:09

## 2020-09-08 RX ADMIN — LORAZEPAM PRN MG: 2 INJECTION INTRAMUSCULAR; INTRAVENOUS at 17:07

## 2020-09-08 RX ADMIN — LORAZEPAM PRN MG: 2 INJECTION INTRAMUSCULAR; INTRAVENOUS at 17:16

## 2020-09-08 RX ADMIN — FENTANYL CITRATE PRN MLS/HR: 50 INJECTION INTRAVENOUS at 02:28

## 2020-09-08 RX ADMIN — FENTANYL CITRATE PRN MLS/HR: 50 INJECTION INTRAVENOUS at 10:10
